# Patient Record
Sex: FEMALE | Race: WHITE | Employment: PART TIME | URBAN - METROPOLITAN AREA
[De-identification: names, ages, dates, MRNs, and addresses within clinical notes are randomized per-mention and may not be internally consistent; named-entity substitution may affect disease eponyms.]

---

## 2017-01-16 ENCOUNTER — OFFICE VISIT (OUTPATIENT)
Dept: FAMILY MEDICINE CLINIC | Age: 22
End: 2017-01-16

## 2017-01-16 VITALS
HEART RATE: 72 BPM | TEMPERATURE: 99.3 F | BODY MASS INDEX: 20.02 KG/M2 | WEIGHT: 143 LBS | HEIGHT: 71 IN | RESPIRATION RATE: 12 BRPM | DIASTOLIC BLOOD PRESSURE: 62 MMHG | SYSTOLIC BLOOD PRESSURE: 116 MMHG

## 2017-01-16 DIAGNOSIS — N94.6 DYSMENORRHEA: ICD-10-CM

## 2017-01-16 PROCEDURE — 99213 OFFICE O/P EST LOW 20 MIN: CPT | Performed by: FAMILY MEDICINE

## 2017-01-16 ASSESSMENT — PATIENT HEALTH QUESTIONNAIRE - PHQ9
SUM OF ALL RESPONSES TO PHQ QUESTIONS 1-9: 0
2. FEELING DOWN, DEPRESSED OR HOPELESS: 0
SUM OF ALL RESPONSES TO PHQ9 QUESTIONS 1 & 2: 0
1. LITTLE INTEREST OR PLEASURE IN DOING THINGS: 0

## 2017-09-13 ENCOUNTER — OFFICE VISIT (OUTPATIENT)
Dept: FAMILY MEDICINE CLINIC | Age: 22
End: 2017-09-13

## 2017-09-13 VITALS
HEIGHT: 71 IN | HEART RATE: 92 BPM | TEMPERATURE: 98.5 F | BODY MASS INDEX: 20.44 KG/M2 | DIASTOLIC BLOOD PRESSURE: 72 MMHG | SYSTOLIC BLOOD PRESSURE: 116 MMHG | OXYGEN SATURATION: 98 % | WEIGHT: 146 LBS | RESPIRATION RATE: 16 BRPM

## 2017-09-13 DIAGNOSIS — J02.9 SORE THROAT: ICD-10-CM

## 2017-09-13 DIAGNOSIS — J06.9 VIRAL URI: Primary | ICD-10-CM

## 2017-09-13 LAB — S PYO AG THROAT QL: NORMAL

## 2017-09-13 PROCEDURE — 99212 OFFICE O/P EST SF 10 MIN: CPT | Performed by: NURSE PRACTITIONER

## 2017-09-13 PROCEDURE — 87880 STREP A ASSAY W/OPTIC: CPT | Performed by: NURSE PRACTITIONER

## 2017-09-13 ASSESSMENT — PATIENT HEALTH QUESTIONNAIRE - PHQ9
SUM OF ALL RESPONSES TO PHQ9 QUESTIONS 1 & 2: 0
2. FEELING DOWN, DEPRESSED OR HOPELESS: 0
1. LITTLE INTEREST OR PLEASURE IN DOING THINGS: 0
SUM OF ALL RESPONSES TO PHQ QUESTIONS 1-9: 0

## 2017-09-13 ASSESSMENT — ENCOUNTER SYMPTOMS
SORE THROAT: 1
COUGH: 1

## 2017-10-20 ENCOUNTER — OFFICE VISIT (OUTPATIENT)
Dept: FAMILY MEDICINE CLINIC | Age: 22
End: 2017-10-20

## 2017-10-20 VITALS
RESPIRATION RATE: 16 BRPM | BODY MASS INDEX: 20.66 KG/M2 | WEIGHT: 147.6 LBS | OXYGEN SATURATION: 98 % | TEMPERATURE: 98.7 F | DIASTOLIC BLOOD PRESSURE: 70 MMHG | HEIGHT: 71 IN | SYSTOLIC BLOOD PRESSURE: 118 MMHG | HEART RATE: 75 BPM

## 2017-10-20 DIAGNOSIS — K04.7 TOOTH INFECTION: ICD-10-CM

## 2017-10-20 DIAGNOSIS — K04.7 TOOTH ABSCESS: Primary | ICD-10-CM

## 2017-10-20 PROCEDURE — G8484 FLU IMMUNIZE NO ADMIN: HCPCS | Performed by: NURSE PRACTITIONER

## 2017-10-20 PROCEDURE — G8420 CALC BMI NORM PARAMETERS: HCPCS | Performed by: NURSE PRACTITIONER

## 2017-10-20 PROCEDURE — 1036F TOBACCO NON-USER: CPT | Performed by: NURSE PRACTITIONER

## 2017-10-20 PROCEDURE — G8427 DOCREV CUR MEDS BY ELIG CLIN: HCPCS | Performed by: NURSE PRACTITIONER

## 2017-10-20 PROCEDURE — 99213 OFFICE O/P EST LOW 20 MIN: CPT | Performed by: NURSE PRACTITIONER

## 2017-10-20 RX ORDER — IBUPROFEN 800 MG/1
800 TABLET ORAL 2 TIMES DAILY WITH MEALS
Qty: 120 TABLET | Refills: 0 | Status: SHIPPED | OUTPATIENT
Start: 2017-10-20 | End: 2017-12-19

## 2017-10-20 RX ORDER — CLINDAMYCIN HYDROCHLORIDE 300 MG/1
300 CAPSULE ORAL 3 TIMES DAILY
Qty: 30 CAPSULE | Refills: 0 | Status: SHIPPED | OUTPATIENT
Start: 2017-10-20 | End: 2017-10-30

## 2017-10-20 ASSESSMENT — ENCOUNTER SYMPTOMS
NAUSEA: 1
WHEEZING: 0
FACIAL SWELLING: 1
SHORTNESS OF BREATH: 0
CHEST TIGHTNESS: 0
VOMITING: 0

## 2017-10-20 NOTE — PROGRESS NOTES
Subjective:      Patient ID: Kai Mortimer is a 22 y.o. female who presents today for:  Chief Complaint   Patient presents with    Jaw Pain     Pt c/o L jaw pain, swollen, wants to r/o infection x3 days       HPI     Jaw swelling for 3 days pain 7/10 never been to dentist didn't have insurance growing up. She is missing half a tooth on both sids of her lower jaw. She states that it hurts when she closes her mouth or chews it is sensitive to temperatures. She took Advil 200mg which does not help. Nothing else making worse or better. History reviewed. No pertinent past medical history. History reviewed. No pertinent surgical history. Family History   Problem Relation Age of Onset    Diabetes Paternal Uncle      Social History     Social History    Marital status: Single     Spouse name: N/A    Number of children: N/A    Years of education: N/A     Occupational History    Not on file. Social History Main Topics    Smoking status: Never Smoker    Smokeless tobacco: Never Used    Alcohol use No    Drug use: No    Sexual activity: No     Other Topics Concern    Not on file     Social History Narrative    No narrative on file     Current Outpatient Prescriptions on File Prior to Visit   Medication Sig Dispense Refill    norethindrone-ethinyl estradiol (NECON 7/7/7) 0.5/0.75/1-35 MG-MCG per tablet TAKE 1 TABLET BY MOUTH DAILY 28 tablet 11     No current facility-administered medications on file prior to visit. Allergies:  Amoxicillin    Review of Systems   Constitutional: Negative for activity change, appetite change, chills, diaphoresis, fatigue and fever. HENT: Positive for facial swelling (left lower jaw). Negative for drooling. Respiratory: Negative for chest tightness, shortness of breath and wheezing. Gastrointestinal: Positive for nausea (she thinks was from the advil use. ). Negative for vomiting. Skin:        Left frontal jaw swelling on bottom.     Allergic/Immunologic: Negative for environmental allergies and food allergies. Neurological: Negative for dizziness, weakness and light-headedness. Objective:   /70 (Site: Left Arm, Position: Sitting, Cuff Size: Medium Adult)   Pulse 75   Temp 98.7 °F (37.1 °C) (Temporal)   Resp 16   Ht 5' 10.5\" (1.791 m)   Wt 147 lb 9.6 oz (67 kg)   SpO2 98%   Breastfeeding? No   BMI 20.88 kg/m²     Physical Exam   Constitutional: She is oriented to person, place, and time. Vital signs are normal. She appears well-developed and well-nourished. HENT:   Head: Normocephalic. Mouth/Throat:       Eyes: Conjunctivae and EOM are normal.   Neck: Normal range of motion. Pulmonary/Chest: Effort normal.   Abdominal: Normal appearance. Musculoskeletal: Normal range of motion. Neurological: She is alert and oriented to person, place, and time. Skin: Skin is warm and dry. Psychiatric: She has a normal mood and affect. Her behavior is normal. Judgment and thought content normal.   Nursing note and vitals reviewed. Assessment:     1. Tooth abscess     2. Tooth infection         Plan:      No orders of the defined types were placed in this encounter. Orders Placed This Encounter   Medications    clindamycin (CLEOCIN) 300 MG capsule     Sig: Take 1 capsule by mouth 3 times daily for 10 days     Dispense:  30 capsule     Refill:  0    ibuprofen (ADVIL;MOTRIN) 800 MG tablet     Sig: Take 1 tablet by mouth 2 times daily (with meals)     Dispense:  120 tablet     Refill:  0     Antibiotics: To prevent antibiotic resistances please take medication as ordered and for the full duration even if you start to feel better. If you are using contraception please use a back up method of contraception such as condoms during antibiotic use and for 7 days after completing treatment to prevent pregnancy.     Females: Consider intake of yogurt or probiotic during antibiotic use and for a few days after to help reduce the risk of

## 2017-10-20 NOTE — PATIENT INSTRUCTIONS
Patient Education        Abscessed Tooth: Care Instructions  Your Care Instructions    An abscessed tooth is a tooth that has a pocket of pus in the tissues around it. Pus forms when the body tries to fight an infection caused by bacteria. If the pus cannot drain, it forms an abscess. An abscessed tooth can cause red, swollen gums and throbbing pain, especially when you chew. You may have a bad taste in your mouth and a fever, and your jaw may swell. Damage to the tooth, untreated tooth decay, or gum disease can cause an abscessed tooth. An abscessed tooth needs to be treated by a dental professional right away. If it is not treated, the infection could spread to other parts of your body. Your dentist will give you antibiotics to stop the infection. He or she may make a hole in the tooth or cut open (josé miguel) the abscess inside your mouth so that the infection can drain, which should relieve your pain. You may need to have a root canal treatment, which tries to save your tooth by taking out the infected pulp and replacing it with a healing medicine and/or a filling. If these treatments do not work, your tooth may have to be removed. Follow-up care is a key part of your treatment and safety. Be sure to make and go to all appointments, and call your doctor if you are having problems. It's also a good idea to know your test results and keep a list of the medicines you take. How can you care for yourself at home? · Reduce pain and swelling in your face and jaw by putting ice or a cold pack on the outside of your cheek for 10 to 20 minutes at a time. Put a thin cloth between the ice and your skin. · Take pain medicines exactly as directed. ¨ If the doctor gave you a prescription medicine for pain, take it as prescribed. ¨ If you are not taking a prescription pain medicine, ask your doctor if you can take an over-the-counter medicine. · Take your antibiotics as directed.  Do not stop taking them just because you feel better. You need to take the full course of antibiotics. To prevent tooth abscess  · Brush and floss every day, and have regular dental checkups. · Eat a healthy diet, and avoid sugary foods and drinks. · Do not smoke, use e-cigarettes with nicotine, or use spit tobacco. Tobacco and nicotine slow your ability to heal. Tobacco also increases your risk for gum disease and cancer of the mouth and throat. If you need help quitting, talk to your doctor about stop-smoking programs and medicines. These can increase your chances of quitting for good. When should you call for help? Call 911 anytime you think you may need emergency care. For example, call if:  · You have trouble breathing. Call your doctor now or seek immediate medical care if:  · You have new or worse symptoms of infection, such as:  ¨ Increased pain, swelling, warmth, or redness. ¨ Red streaks leading from the area. ¨ Pus draining from the area. ¨ A fever. Watch closely for changes in your health, and be sure to contact your doctor if:  · You do not get better as expected. Where can you learn more? Go to https://YPlan.[employer]Meeting. org and sign in to your [employer] account. Enter D145 in the [facility] box to learn more about \"Abscessed Tooth: Care Instructions. \"     If you do not have an account, please click on the \"Sign Up Now\" link. Current as of: September 19, 2016  Content Version: 11.3  © 3900-3321 [employer], [employer]. Care instructions adapted under license by [facility] ([facility]). If you have questions about a medical condition or this instruction, always ask your healthcare professional. [place] [address] any warranty or liability for your use of this information.

## 2017-11-27 ENCOUNTER — OFFICE VISIT (OUTPATIENT)
Dept: FAMILY MEDICINE CLINIC | Age: 22
End: 2017-11-27

## 2017-11-27 VITALS
HEART RATE: 72 BPM | SYSTOLIC BLOOD PRESSURE: 102 MMHG | TEMPERATURE: 98.9 F | BODY MASS INDEX: 20.23 KG/M2 | RESPIRATION RATE: 12 BRPM | DIASTOLIC BLOOD PRESSURE: 58 MMHG | WEIGHT: 143 LBS

## 2017-11-27 DIAGNOSIS — R10.13 ABDOMINAL PAIN, EPIGASTRIC: Primary | ICD-10-CM

## 2017-11-27 DIAGNOSIS — Z23 NEED FOR HEPATITIS B VACCINATION: ICD-10-CM

## 2017-11-27 DIAGNOSIS — Z00.00 ROUTINE GENERAL MEDICAL EXAMINATION AT A HEALTH CARE FACILITY: ICD-10-CM

## 2017-11-27 PROCEDURE — G8427 DOCREV CUR MEDS BY ELIG CLIN: HCPCS | Performed by: FAMILY MEDICINE

## 2017-11-27 PROCEDURE — 90471 IMMUNIZATION ADMIN: CPT | Performed by: FAMILY MEDICINE

## 2017-11-27 PROCEDURE — G8420 CALC BMI NORM PARAMETERS: HCPCS | Performed by: FAMILY MEDICINE

## 2017-11-27 PROCEDURE — G8484 FLU IMMUNIZE NO ADMIN: HCPCS | Performed by: FAMILY MEDICINE

## 2017-11-27 PROCEDURE — 86580 TB INTRADERMAL TEST: CPT | Performed by: FAMILY MEDICINE

## 2017-11-27 PROCEDURE — 1036F TOBACCO NON-USER: CPT | Performed by: FAMILY MEDICINE

## 2017-11-27 PROCEDURE — 90746 HEPB VACCINE 3 DOSE ADULT IM: CPT | Performed by: FAMILY MEDICINE

## 2017-11-27 PROCEDURE — 99213 OFFICE O/P EST LOW 20 MIN: CPT | Performed by: FAMILY MEDICINE

## 2017-11-27 NOTE — PROGRESS NOTES
Chief Complaint   Patient presents with    Annual Exam     school exam        HPI:   She reports having some stomach problems. This started last Monday and got better by Friday. She had nausea, abdominal pain, loss of appetite, and diarrhea. This is better now. Her appetitive is improved and the nausea resolved. Humberto Garcia is a 22 y.o. female presenting for school physical. She needs immunizations for school. EXAM:  Constitutional Blood pressure (!) 102/58, pulse 72, temperature 98.9 °F (37.2 °C), temperature source Temporal, resp. rate 12, weight 143 lb (64.9 kg), not currently breastfeeding. Darlene Weston Physical Exam   Constitutional: She is oriented to person, place, and time. She appears well-developed and well-nourished. Cardiovascular: Normal rate, regular rhythm and normal heart sounds. Pulmonary/Chest: Effort normal and breath sounds normal.   Abdominal: Soft. Bowel sounds are normal. She exhibits no distension. There is no tenderness. Neurological: She is alert and oriented to person, place, and time. DIAGNOSIS:   1. Abdominal pain, epigastric      Intermittently symptomatic, no further workup at present. 2. Need for hepatitis B vaccination  Hep B Vaccine Adult (RECOMBIVAX HB)    Give first immunization. 3. Routine general medical examination at a health care facility  Mantoux testing    Check Mantoux test as this is required by school. patient will need hepatitis B vaccinations in 1 and 6 months. Plan for follow up: Follow up in 1 year with blood work as ordered. Other follow up as needed.       Electronically signed by Jimbo Wasserman, 10:18 PM 11/27/17

## 2017-11-27 NOTE — LETTER
[facility]  [address] [facility] [address]  Phone: [phone]  Fax: [phone]    Franca Stearns MD        November 27, 2017     Patient: Hillary Kathleen   YOB: 1995   Date of Visit: 11/27/2017       To Whom It May Concern: It is my medical opinion that Amber Francis may return to full duty immediately with no restrictions. She was sick from 11/23/17 through today. If you have any questions or concerns, please don't hesitate to call.     Sincerely,        Franca Stearns MD

## 2017-11-29 ENCOUNTER — NURSE ONLY (OUTPATIENT)
Dept: FAMILY MEDICINE CLINIC | Age: 22
End: 2017-11-29

## 2017-11-29 NOTE — LETTER
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 The results of this test are as follows: No results found for: PPD, INDURATION  This test was negative for tuberculosis exposure per current Centers for Disease Control guidelines. A chest x-ray was not required. If you have any questions or concerns, please don't hesitate to call.     Sincerely,        Milan Cheatham

## 2017-11-29 NOTE — PROGRESS NOTES
Patient came in to have her TB test read. It was on her right arm. It was negative. Patient will schedule her next TB shot at check out.

## 2017-12-04 ENCOUNTER — NURSE ONLY (OUTPATIENT)
Dept: FAMILY MEDICINE CLINIC | Age: 22
End: 2017-12-04

## 2017-12-04 DIAGNOSIS — Z11.1 SCREENING EXAMINATION FOR PULMONARY TUBERCULOSIS: Primary | ICD-10-CM

## 2017-12-04 PROCEDURE — 86580 TB INTRADERMAL TEST: CPT | Performed by: FAMILY MEDICINE

## 2017-12-07 ENCOUNTER — NURSE ONLY (OUTPATIENT)
Dept: FAMILY MEDICINE CLINIC | Age: 22
End: 2017-12-07

## 2017-12-07 NOTE — LETTER
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 The results of this test are as follows: This test was negative for tuberculosis exposure per current Centers for Disease Control guidelines. A chest x-ray was not required. If you have any questions or concerns, please don't hesitate to call.     Sincerely,        Myrick Moritz

## 2018-02-05 ENCOUNTER — NURSE ONLY (OUTPATIENT)
Dept: FAMILY MEDICINE CLINIC | Age: 23
End: 2018-02-05
Payer: COMMERCIAL

## 2018-02-05 DIAGNOSIS — Z23 NEED FOR HEPATITIS B VACCINATION: Primary | ICD-10-CM

## 2018-02-05 PROCEDURE — 90471 IMMUNIZATION ADMIN: CPT | Performed by: FAMILY MEDICINE

## 2018-02-05 PROCEDURE — 90746 HEPB VACCINE 3 DOSE ADULT IM: CPT | Performed by: FAMILY MEDICINE

## 2018-03-29 ENCOUNTER — OFFICE VISIT (OUTPATIENT)
Dept: FAMILY MEDICINE CLINIC | Age: 23
End: 2018-03-29
Payer: COMMERCIAL

## 2018-03-29 VITALS
RESPIRATION RATE: 16 BRPM | SYSTOLIC BLOOD PRESSURE: 122 MMHG | WEIGHT: 149 LBS | TEMPERATURE: 99.2 F | BODY MASS INDEX: 21.33 KG/M2 | HEART RATE: 104 BPM | OXYGEN SATURATION: 99 % | HEIGHT: 70 IN | DIASTOLIC BLOOD PRESSURE: 80 MMHG

## 2018-03-29 DIAGNOSIS — F32.1 MODERATE SINGLE CURRENT EPISODE OF MAJOR DEPRESSIVE DISORDER (HCC): Primary | ICD-10-CM

## 2018-03-29 PROCEDURE — 96160 PT-FOCUSED HLTH RISK ASSMT: CPT | Performed by: FAMILY MEDICINE

## 2018-03-29 PROCEDURE — G8427 DOCREV CUR MEDS BY ELIG CLIN: HCPCS | Performed by: FAMILY MEDICINE

## 2018-03-29 PROCEDURE — G8420 CALC BMI NORM PARAMETERS: HCPCS | Performed by: FAMILY MEDICINE

## 2018-03-29 PROCEDURE — 1036F TOBACCO NON-USER: CPT | Performed by: FAMILY MEDICINE

## 2018-03-29 PROCEDURE — 99213 OFFICE O/P EST LOW 20 MIN: CPT | Performed by: FAMILY MEDICINE

## 2018-03-29 PROCEDURE — G8484 FLU IMMUNIZE NO ADMIN: HCPCS | Performed by: FAMILY MEDICINE

## 2018-03-29 RX ORDER — ESCITALOPRAM OXALATE 10 MG/1
10 TABLET ORAL DAILY
Qty: 30 TABLET | Refills: 0 | Status: SHIPPED | OUTPATIENT
Start: 2018-03-29 | End: 2018-04-28 | Stop reason: SDUPTHER

## 2018-03-29 ASSESSMENT — PATIENT HEALTH QUESTIONNAIRE - PHQ9
6. FEELING BAD ABOUT YOURSELF - OR THAT YOU ARE A FAILURE OR HAVE LET YOURSELF OR YOUR FAMILY DOWN: 0
1. LITTLE INTEREST OR PLEASURE IN DOING THINGS: 3
4. FEELING TIRED OR HAVING LITTLE ENERGY: 3
SUM OF ALL RESPONSES TO PHQ QUESTIONS 1-9: 14
7. TROUBLE CONCENTRATING ON THINGS, SUCH AS READING THE NEWSPAPER OR WATCHING TELEVISION: 0
8. MOVING OR SPEAKING SO SLOWLY THAT OTHER PEOPLE COULD HAVE NOTICED. OR THE OPPOSITE, BEING SO FIGETY OR RESTLESS THAT YOU HAVE BEEN MOVING AROUND A LOT MORE THAN USUAL: 0
3. TROUBLE FALLING OR STAYING ASLEEP: 3
2. FEELING DOWN, DEPRESSED OR HOPELESS: 3
5. POOR APPETITE OR OVEREATING: 2
SUM OF ALL RESPONSES TO PHQ9 QUESTIONS 1 & 2: 6
10. IF YOU CHECKED OFF ANY PROBLEMS, HOW DIFFICULT HAVE THESE PROBLEMS MADE IT FOR YOU TO DO YOUR WORK, TAKE CARE OF THINGS AT HOME, OR GET ALONG WITH OTHER PEOPLE: 0
9. THOUGHTS THAT YOU WOULD BE BETTER OFF DEAD, OR OF HURTING YOURSELF: 0

## 2018-03-31 PROBLEM — F32.1 MODERATE SINGLE CURRENT EPISODE OF MAJOR DEPRESSIVE DISORDER (HCC): Status: ACTIVE | Noted: 2018-03-31

## 2018-04-28 ENCOUNTER — OFFICE VISIT (OUTPATIENT)
Dept: FAMILY MEDICINE CLINIC | Age: 23
End: 2018-04-28
Payer: COMMERCIAL

## 2018-04-28 VITALS
HEART RATE: 71 BPM | OXYGEN SATURATION: 99 % | BODY MASS INDEX: 22.68 KG/M2 | DIASTOLIC BLOOD PRESSURE: 84 MMHG | HEIGHT: 70 IN | TEMPERATURE: 98.1 F | WEIGHT: 158.4 LBS | RESPIRATION RATE: 16 BRPM | SYSTOLIC BLOOD PRESSURE: 110 MMHG

## 2018-04-28 DIAGNOSIS — F32.1 MODERATE SINGLE CURRENT EPISODE OF MAJOR DEPRESSIVE DISORDER (HCC): ICD-10-CM

## 2018-04-28 PROCEDURE — G8427 DOCREV CUR MEDS BY ELIG CLIN: HCPCS | Performed by: FAMILY MEDICINE

## 2018-04-28 PROCEDURE — G8420 CALC BMI NORM PARAMETERS: HCPCS | Performed by: FAMILY MEDICINE

## 2018-04-28 PROCEDURE — 99213 OFFICE O/P EST LOW 20 MIN: CPT | Performed by: FAMILY MEDICINE

## 2018-04-28 PROCEDURE — 1036F TOBACCO NON-USER: CPT | Performed by: FAMILY MEDICINE

## 2018-04-28 RX ORDER — ESCITALOPRAM OXALATE 10 MG/1
10 TABLET ORAL DAILY
Qty: 90 TABLET | Refills: 1 | Status: SHIPPED | OUTPATIENT
Start: 2018-04-28 | End: 2018-09-14 | Stop reason: SDUPTHER

## 2018-04-28 ASSESSMENT — PATIENT HEALTH QUESTIONNAIRE - PHQ9
1. LITTLE INTEREST OR PLEASURE IN DOING THINGS: 0
SUM OF ALL RESPONSES TO PHQ QUESTIONS 1-9: 0
SUM OF ALL RESPONSES TO PHQ9 QUESTIONS 1 & 2: 0
2. FEELING DOWN, DEPRESSED OR HOPELESS: 0

## 2018-05-29 DIAGNOSIS — F32.1 MODERATE SINGLE CURRENT EPISODE OF MAJOR DEPRESSIVE DISORDER (HCC): ICD-10-CM

## 2018-05-29 RX ORDER — ESCITALOPRAM OXALATE 10 MG/1
10 TABLET ORAL DAILY
Qty: 90 TABLET | Refills: 1 | OUTPATIENT
Start: 2018-05-29

## 2018-08-06 ENCOUNTER — NURSE ONLY (OUTPATIENT)
Dept: FAMILY MEDICINE CLINIC | Age: 23
End: 2018-08-06
Payer: COMMERCIAL

## 2018-08-06 DIAGNOSIS — Z23 NEED FOR HEPATITIS B VACCINATION: Primary | ICD-10-CM

## 2018-08-06 PROCEDURE — 90746 HEPB VACCINE 3 DOSE ADULT IM: CPT | Performed by: FAMILY MEDICINE

## 2018-08-06 PROCEDURE — 90471 IMMUNIZATION ADMIN: CPT | Performed by: FAMILY MEDICINE

## 2018-08-06 NOTE — PROGRESS NOTES
After obtaining consent, and per orders of Dr. Shirley De Luna, injection of Hep B given in Right deltoid by Ricardo Morgan. Patient instructed to remain in clinic for 20 minutes afterwards, and to report any adverse reaction to me immediately.

## 2018-09-14 DIAGNOSIS — F32.1 MODERATE SINGLE CURRENT EPISODE OF MAJOR DEPRESSIVE DISORDER (HCC): ICD-10-CM

## 2018-09-14 NOTE — TELEPHONE ENCOUNTER
Pharmacy requests refill on medication.  Please approve or deny this request.    LOV 4/28/2018    Future Appointments  Date Time Provider Margarita Tong   10/24/2018 12:30 PM Patsi Curling, MD [address] [place]

## 2018-09-17 RX ORDER — ESCITALOPRAM OXALATE 10 MG/1
TABLET ORAL
Qty: 150 TABLET | Refills: 2 | Status: SHIPPED | OUTPATIENT
Start: 2018-09-17 | End: 2018-10-24 | Stop reason: SDUPTHER

## 2018-10-24 ENCOUNTER — OFFICE VISIT (OUTPATIENT)
Dept: FAMILY MEDICINE CLINIC | Age: 23
End: 2018-10-24
Payer: COMMERCIAL

## 2018-10-24 VITALS
RESPIRATION RATE: 18 BRPM | OXYGEN SATURATION: 95 % | TEMPERATURE: 98.3 F | HEIGHT: 70 IN | HEART RATE: 69 BPM | WEIGHT: 164.2 LBS | BODY MASS INDEX: 23.51 KG/M2 | DIASTOLIC BLOOD PRESSURE: 78 MMHG | SYSTOLIC BLOOD PRESSURE: 100 MMHG

## 2018-10-24 DIAGNOSIS — F32.1 MODERATE SINGLE CURRENT EPISODE OF MAJOR DEPRESSIVE DISORDER (HCC): Primary | ICD-10-CM

## 2018-10-24 DIAGNOSIS — Z13.31 POSITIVE DEPRESSION SCREENING: ICD-10-CM

## 2018-10-24 PROCEDURE — 1036F TOBACCO NON-USER: CPT | Performed by: FAMILY MEDICINE

## 2018-10-24 PROCEDURE — G8427 DOCREV CUR MEDS BY ELIG CLIN: HCPCS | Performed by: FAMILY MEDICINE

## 2018-10-24 PROCEDURE — G8420 CALC BMI NORM PARAMETERS: HCPCS | Performed by: FAMILY MEDICINE

## 2018-10-24 PROCEDURE — G8484 FLU IMMUNIZE NO ADMIN: HCPCS | Performed by: FAMILY MEDICINE

## 2018-10-24 PROCEDURE — 96160 PT-FOCUSED HLTH RISK ASSMT: CPT | Performed by: FAMILY MEDICINE

## 2018-10-24 PROCEDURE — 99213 OFFICE O/P EST LOW 20 MIN: CPT | Performed by: FAMILY MEDICINE

## 2018-10-24 PROCEDURE — G8431 POS CLIN DEPRES SCRN F/U DOC: HCPCS | Performed by: FAMILY MEDICINE

## 2018-10-24 RX ORDER — ESCITALOPRAM OXALATE 20 MG/1
20 TABLET ORAL DAILY
Qty: 30 TABLET | Refills: 5 | Status: SHIPPED | OUTPATIENT
Start: 2018-10-24 | End: 2022-05-03

## 2018-10-24 ASSESSMENT — PATIENT HEALTH QUESTIONNAIRE - PHQ9
9. THOUGHTS THAT YOU WOULD BE BETTER OFF DEAD, OR OF HURTING YOURSELF: 0
4. FEELING TIRED OR HAVING LITTLE ENERGY: 3
6. FEELING BAD ABOUT YOURSELF - OR THAT YOU ARE A FAILURE OR HAVE LET YOURSELF OR YOUR FAMILY DOWN: 0
8. MOVING OR SPEAKING SO SLOWLY THAT OTHER PEOPLE COULD HAVE NOTICED. OR THE OPPOSITE, BEING SO FIGETY OR RESTLESS THAT YOU HAVE BEEN MOVING AROUND A LOT MORE THAN USUAL: 0
3. TROUBLE FALLING OR STAYING ASLEEP: 3
1. LITTLE INTEREST OR PLEASURE IN DOING THINGS: 2
SUM OF ALL RESPONSES TO PHQ QUESTIONS 1-9: 12
2. FEELING DOWN, DEPRESSED OR HOPELESS: 2
SUM OF ALL RESPONSES TO PHQ9 QUESTIONS 1 & 2: 4
10. IF YOU CHECKED OFF ANY PROBLEMS, HOW DIFFICULT HAVE THESE PROBLEMS MADE IT FOR YOU TO DO YOUR WORK, TAKE CARE OF THINGS AT HOME, OR GET ALONG WITH OTHER PEOPLE: 1
5. POOR APPETITE OR OVEREATING: 0
7. TROUBLE CONCENTRATING ON THINGS, SUCH AS READING THE NEWSPAPER OR WATCHING TELEVISION: 2
SUM OF ALL RESPONSES TO PHQ QUESTIONS 1-9: 12

## 2018-10-24 NOTE — PROGRESS NOTES
up from 10- patient will call for any significant medication side effects or worsening symptoms of depression. Patient will follow-up in 6 month(s) with PCP.

## 2019-02-21 ENCOUNTER — TELEPHONE (OUTPATIENT)
Dept: FAMILY MEDICINE CLINIC | Age: 24
End: 2019-02-21

## 2019-02-25 ENCOUNTER — TELEPHONE (OUTPATIENT)
Dept: FAMILY MEDICINE CLINIC | Age: 24
End: 2019-02-25

## 2019-04-26 ENCOUNTER — OFFICE VISIT (OUTPATIENT)
Dept: OBGYN CLINIC | Age: 24
End: 2019-04-26
Payer: COMMERCIAL

## 2019-04-26 VITALS
HEART RATE: 84 BPM | HEIGHT: 70 IN | WEIGHT: 161 LBS | DIASTOLIC BLOOD PRESSURE: 62 MMHG | SYSTOLIC BLOOD PRESSURE: 100 MMHG | BODY MASS INDEX: 23.05 KG/M2

## 2019-04-26 DIAGNOSIS — N94.10 DYSPAREUNIA, FEMALE: Primary | ICD-10-CM

## 2019-04-26 DIAGNOSIS — N94.6 DYSMENORRHEA: ICD-10-CM

## 2019-04-26 DIAGNOSIS — N94.10 DYSPAREUNIA, FEMALE: ICD-10-CM

## 2019-04-26 PROCEDURE — 99213 OFFICE O/P EST LOW 20 MIN: CPT | Performed by: OBSTETRICS & GYNECOLOGY

## 2019-04-26 PROCEDURE — 1036F TOBACCO NON-USER: CPT | Performed by: OBSTETRICS & GYNECOLOGY

## 2019-04-26 PROCEDURE — G8427 DOCREV CUR MEDS BY ELIG CLIN: HCPCS | Performed by: OBSTETRICS & GYNECOLOGY

## 2019-04-26 PROCEDURE — G8420 CALC BMI NORM PARAMETERS: HCPCS | Performed by: OBSTETRICS & GYNECOLOGY

## 2019-04-26 NOTE — PROGRESS NOTES
Gulshan Stout is a 26 y.o. female who presents here today for complaints of pain during intercourse, has been happening recently with no partner which patient does report might have \"larger organ\" according to her description. Patient mentions that she gets the pain occasionally, patient also mentions that the pain is mild is crampy in nature. Patient denies any fever or chills, denies any nausea or vomiting. Patient denies any abnormal vaginal discharge. Vitals:  /62 (Site: Left Upper Arm, Position: Sitting, Cuff Size: Medium Adult)   Pulse 84   Ht 5' 10\" (1.778 m)   Wt 161 lb (73 kg)   LMP 04/17/2019 (Approximate)   BMI 23.10 kg/m²   Allergies:  Amoxicillin  No past medical history on file. No past surgical history on file.   OB History    None       Family History   Problem Relation Age of Onset    Cancer Mother     Diabetes Paternal Uncle      Social History     Socioeconomic History    Marital status: Single     Spouse name: Not on file    Number of children: Not on file    Years of education: Not on file    Highest education level: Not on file   Occupational History    Not on file   Social Needs    Financial resource strain: Not on file    Food insecurity:     Worry: Not on file     Inability: Not on file    Transportation needs:     Medical: Not on file     Non-medical: Not on file   Tobacco Use    Smoking status: Never Smoker    Smokeless tobacco: Never Used   Substance and Sexual Activity    Alcohol use: No    Drug use: No    Sexual activity: Never   Lifestyle    Physical activity:     Days per week: Not on file     Minutes per session: Not on file    Stress: Not on file   Relationships    Social connections:     Talks on phone: Not on file     Gets together: Not on file     Attends Voodoo service: Not on file     Active member of club or organization: Not on file     Attends meetings of clubs or organizations: Not on file     Relationship status: Not on file  Intimate partner violence:     Fear of current or ex partner: Not on file     Emotionally abused: Not on file     Physically abused: Not on file     Forced sexual activity: Not on file   Other Topics Concern    Not on file   Social History Narrative    Not on file       Contraceptive method:  OCP (estrogen/progesterone)    Patient's medications, allergies, past medical, surgical, social and family histories were reviewed and updated as appropriate. Review of Systems  As per chief complaint   All other systems reviewed and are negative. Pain with intercourse  Physical Exam:  Vitals:  /62 (Site: Left Upper Arm, Position: Sitting, Cuff Size: Medium Adult)   Pulse 84   Ht 5' 10\" (1.778 m)   Wt 161 lb (73 kg)   LMP 04/17/2019 (Approximate)   BMI 23.10 kg/m²   Lungs: CTAB   Heart : Regular S1/S2, no M/R/G  Abdomen: Soft , NT, ND , + BS   Pelvic exam : Deferred    Assessment:      Diagnosis Orders   1.  Dyspareunia, female  US Non OB Transvaginal    US Pelvis Complete    Trichomonas Vaginalis Rna, Qual Tma, Pap Via    C.trachomatis N.gonorrhoeae DNA, Urine       Plan:     Pelvic ultrasound sent  Gonorrhea Chlamydia trichomonas also ordered and urine specimen  Patient advised to follow up for annual exam visit including Pap smear  Discussed at pain with intercourse due to the reason she had menses mentioned in her H&P is normal I did explain to the patient to avoid the positions that could be eliciting more pain than the usual.    Orders Placed This Encounter   Procedures    Trichomonas Vaginalis Rna, Qual Tma, Pap Via     Standing Status:   Future     Standing Expiration Date:   4/26/2020    C.trachomatis N.gonorrhoeae DNA, Urine     Standing Status:   Future     Standing Expiration Date:   4/26/2020    US Non OB Transvaginal     Standing Status:   Future     Standing Expiration Date:   4/25/2020    US Pelvis Complete     Standing Status:   Future     Standing Expiration Date:   4/25/2020     Order Specific Question:   Reason for exam:     Answer:   AUB     No orders of the defined types were placed in this encounter. Patient was seen with total face to face time of 15 minutes. More than 50% of this visit was counseling and education regarding The encounter diagnosis was Dyspareunia, female. and Discuss Medications (Contraception. Previously on OCP but would like to discuss other options. ) and Vaginal Bleeding (Bled after intercourse a couple of weeks ago. )   as well as  counseling on preventative health maintenance follow-up. Follow Up:  Return in about 2 weeks (around 5/10/2019) for F/U for results.         Anthony Thomas MD

## 2019-04-26 NOTE — TELEPHONE ENCOUNTER
Patient contacted and made aware of prescription being sent to the pharmacy. Annual scheduled for 5/10/19.

## 2019-04-30 LAB
SPECIMEN SOURCE: NORMAL
T. VAGINALIS AMPLIFIED: NEGATIVE

## 2019-05-02 LAB
C. TRACHOMATIS DNA ,URINE: NEGATIVE
N. GONORRHOEAE DNA, URINE: NEGATIVE

## 2019-05-10 ENCOUNTER — OFFICE VISIT (OUTPATIENT)
Dept: OBGYN CLINIC | Age: 24
End: 2019-05-10
Payer: COMMERCIAL

## 2019-05-10 VITALS
HEIGHT: 70 IN | WEIGHT: 162 LBS | DIASTOLIC BLOOD PRESSURE: 62 MMHG | SYSTOLIC BLOOD PRESSURE: 100 MMHG | HEART RATE: 96 BPM | BODY MASS INDEX: 23.19 KG/M2

## 2019-05-10 DIAGNOSIS — Z11.51 SCREENING FOR HPV (HUMAN PAPILLOMAVIRUS): ICD-10-CM

## 2019-05-10 DIAGNOSIS — Z01.419 VISIT FOR GYNECOLOGIC EXAMINATION: Primary | ICD-10-CM

## 2019-05-10 PROCEDURE — 99395 PREV VISIT EST AGE 18-39: CPT | Performed by: OBSTETRICS & GYNECOLOGY

## 2019-05-10 NOTE — PROGRESS NOTES
Subjective:      Kaleb Moreland is a 26 y. o.female No obstetric history on file here for routine exam. Current Complaints: normal menses, no abnormal bleeding, pelvic pain or discharge, no breast pain or new or enlarging lumps on self exam.    Menstrual history:  regular menses   Sexual activity:  yes, denies knowledge of risky exposure  Abnormal vaginaldischarge:  No  Contraceptive method:  OCP (estrogen/progesterone)    Vitals:  /62 (Site: Left Upper Arm, Position: Sitting, Cuff Size: Medium Adult)   Pulse 96   Ht 5' 10\" (1.778 m)   Wt 162 lb (73.5 kg)   LMP 04/17/2019 (Approximate)   BMI 23.24 kg/m²   Allergies:  Amoxicillin   No past medical history on file. No past surgical history on file.   Family History   Problem Relation Age of Onset    Cancer Mother     Diabetes Paternal Uncle      Social History     Socioeconomic History    Marital status: Single     Spouse name: Not on file    Number of children: Not on file    Years of education: Not on file    Highest education level: Not on file   Occupational History    Not on file   Social Needs    Financial resource strain: Not on file    Food insecurity:     Worry: Not on file     Inability: Not on file    Transportation needs:     Medical: Not on file     Non-medical: Not on file   Tobacco Use    Smoking status: Never Smoker    Smokeless tobacco: Never Used   Substance and Sexual Activity    Alcohol use: No    Drug use: No    Sexual activity: Never   Lifestyle    Physical activity:     Days per week: Not on file     Minutes per session: Not on file    Stress: Not on file   Relationships    Social connections:     Talks on phone: Not on file     Gets together: Not on file     Attends Worship service: Not on file     Active member of club or organization: Not on file     Attends meetings of clubs or organizations: Not on file     Relationship status: Not on file    Intimate partner violence:     Fear of current or ex partner: Not on file     Emotionally abused: Not on file     Physically abused: Not on file     Forced sexual activity: Not on file   Other Topics Concern    Not on file   Social History Narrative    Not on file       Gynecologic History  Patient's last menstrual period was 04/17/2019 (approximate). Last Pap: none   Results: negative   Last Mammogram: No Results: N/A  FH breast cancer : mother's aunt. Mother : endometrial cancer. OB History    None       Patient's medications, allergies, past medical, surgical, social and family histories were reviewed and updated as appropriate. Review of Systems  Review of Systems  Review of Systems   Constitutional: Negative for chills and fever. Respiratory: Negative for cough and shortnessof breath. Cardiovascular: Negative for chest pain and palpitations. Gastrointestinal: Negative for nausea and vomiting. Genitourinary: Negative for dysuria,frequency and urgency. Musculoskeletal: Negative for myalgias. Neurological: Negative for dizziness, seizures and headaches. Psychiatric/Behavioral: Negative for depression and suicidal ideas. All other systemsreviewed and are negative. Objective:     Vitals:  /62 (Site: Left Upper Arm, Position: Sitting, Cuff Size: Medium Adult)   Pulse 96   Ht 5' 10\" (1.778 m)   Wt 162 lb (73.5 kg)   LMP 04/17/2019 (Approximate)   BMI 23.24 kg/m²     Physical Exam  Physical Exam  Physical Exam   Constitutional: She is oriented to person,place, and time and well-developed, well-nourished, and in no distress. HENT:   Head: Normocephalic and atraumatic. Eyes: Conjunctivae are normal. Pupils are equal, round, andreactive to light. Neck: Normal range of motion. Neck supple. Cardiovascular: Normal rate and regular rhythm. Pulmonary/Chest: Effort normal and breath soundsnormal. No respiratory distress. Right breast exhibits no inverted nipple, no mass, no nipple discharge, no skin change and no tenderness.  Left

## 2019-05-20 LAB — PAP SMEAR: NORMAL

## 2019-06-09 PROBLEM — Z11.51 SCREENING FOR HPV (HUMAN PAPILLOMAVIRUS): Status: RESOLVED | Noted: 2019-05-10 | Resolved: 2019-06-09

## 2019-06-09 PROBLEM — Z01.419 VISIT FOR GYNECOLOGIC EXAMINATION: Status: RESOLVED | Noted: 2019-05-10 | Resolved: 2019-06-09

## 2019-08-19 ENCOUNTER — OFFICE VISIT (OUTPATIENT)
Dept: OBGYN CLINIC | Age: 24
End: 2019-08-19
Payer: COMMERCIAL

## 2019-08-19 VITALS
HEIGHT: 70 IN | SYSTOLIC BLOOD PRESSURE: 100 MMHG | HEART RATE: 88 BPM | DIASTOLIC BLOOD PRESSURE: 62 MMHG | WEIGHT: 159 LBS | BODY MASS INDEX: 22.76 KG/M2

## 2019-08-19 DIAGNOSIS — N92.6 IRREGULAR MENSES: Primary | ICD-10-CM

## 2019-08-19 PROCEDURE — G8427 DOCREV CUR MEDS BY ELIG CLIN: HCPCS | Performed by: OBSTETRICS & GYNECOLOGY

## 2019-08-19 PROCEDURE — G8420 CALC BMI NORM PARAMETERS: HCPCS | Performed by: OBSTETRICS & GYNECOLOGY

## 2019-08-19 PROCEDURE — 1036F TOBACCO NON-USER: CPT | Performed by: OBSTETRICS & GYNECOLOGY

## 2019-08-19 PROCEDURE — 99213 OFFICE O/P EST LOW 20 MIN: CPT | Performed by: OBSTETRICS & GYNECOLOGY

## 2019-09-20 ENCOUNTER — TELEPHONE (OUTPATIENT)
Dept: OBGYN CLINIC | Age: 24
End: 2019-09-20

## 2021-01-22 DIAGNOSIS — N94.6 DYSMENORRHEA: ICD-10-CM

## 2021-01-22 RX ORDER — NORETHINDRONE AND ETHINYL ESTRADIOL 7 DAYS X 3
KIT ORAL
Qty: 28 TABLET | Refills: 11 | Status: SHIPPED | OUTPATIENT
Start: 2021-01-22 | End: 2021-12-24

## 2021-12-21 DIAGNOSIS — N94.6 DYSMENORRHEA: ICD-10-CM

## 2021-12-24 RX ORDER — NORETHINDRONE AND ETHINYL ESTRADIOL 7 DAYS X 3
KIT ORAL
Qty: 28 TABLET | Refills: 11 | Status: SHIPPED | OUTPATIENT
Start: 2021-12-24

## 2022-05-03 ENCOUNTER — OFFICE VISIT (OUTPATIENT)
Dept: OBGYN CLINIC | Age: 27
End: 2022-05-03
Payer: COMMERCIAL

## 2022-05-03 VITALS
HEART RATE: 72 BPM | DIASTOLIC BLOOD PRESSURE: 62 MMHG | SYSTOLIC BLOOD PRESSURE: 104 MMHG | BODY MASS INDEX: 27.06 KG/M2 | HEIGHT: 70 IN | WEIGHT: 189 LBS

## 2022-05-03 DIAGNOSIS — N92.6 IRREGULAR BLEEDING: Primary | ICD-10-CM

## 2022-05-03 DIAGNOSIS — N92.1 BREAKTHROUGH BLEEDING ON OCPS: ICD-10-CM

## 2022-05-03 PROCEDURE — 1036F TOBACCO NON-USER: CPT | Performed by: OBSTETRICS & GYNECOLOGY

## 2022-05-03 PROCEDURE — G8427 DOCREV CUR MEDS BY ELIG CLIN: HCPCS | Performed by: OBSTETRICS & GYNECOLOGY

## 2022-05-03 PROCEDURE — 99213 OFFICE O/P EST LOW 20 MIN: CPT | Performed by: OBSTETRICS & GYNECOLOGY

## 2022-05-03 PROCEDURE — G8419 CALC BMI OUT NRM PARAM NOF/U: HCPCS | Performed by: OBSTETRICS & GYNECOLOGY

## 2022-05-03 RX ORDER — TRAZODONE HYDROCHLORIDE 100 MG/1
TABLET ORAL
COMMUNITY
Start: 2022-05-02

## 2022-05-03 RX ORDER — ARIPIPRAZOLE 10 MG/1
TABLET ORAL
COMMUNITY
Start: 2022-05-02

## 2022-05-03 RX ORDER — BUSPIRONE HYDROCHLORIDE 10 MG/1
TABLET ORAL
COMMUNITY
Start: 2022-05-02

## 2022-05-03 NOTE — PROGRESS NOTES
Maren Hensley is a 23 y.o. female who presents here today for complaints of Irregular Menses (She states she usually gets her period when she's supposed to with her birth control pills but she had an episode of breakthrough bleeding. She states she was bleeding pretty heavily and bleeding through tampons.)    Heavy menses and intermenstrual bleeding. Vitals:  /62 (Site: Right Upper Arm, Position: Sitting, Cuff Size: Medium Adult)   Pulse 72   Ht 5' 10\" (1.778 m)   Wt 189 lb (85.7 kg)   BMI 27.12 kg/m²   Allergies:  Amoxicillin  No past medical history on file. No past surgical history on file. OB History    No obstetric history on file. Family History   Problem Relation Age of Onset    Cancer Mother     Diabetes Paternal Uncle      Social History     Socioeconomic History    Marital status: Single     Spouse name: Not on file    Number of children: Not on file    Years of education: Not on file    Highest education level: Not on file   Occupational History    Not on file   Tobacco Use    Smoking status: Never Smoker    Smokeless tobacco: Never Used   Substance and Sexual Activity    Alcohol use: No    Drug use: No    Sexual activity: Never   Other Topics Concern    Not on file   Social History Narrative    Not on file     Social Determinants of Health     Financial Resource Strain:     Difficulty of Paying Living Expenses: Not on file   Food Insecurity:     Worried About Running Out of Food in the Last Year: Not on file    Valerie of Food in the Last Year: Not on file   Transportation Needs:     Lack of Transportation (Medical): Not on file    Lack of Transportation (Non-Medical):  Not on file   Physical Activity:     Days of Exercise per Week: Not on file    Minutes of Exercise per Session: Not on file   Stress:     Feeling of Stress : Not on file   Social Connections:     Frequency of Communication with Friends and Family: Not on file    Frequency of Social Gatherings with Friends and Family: Not on file    Attends Holiness Services: Not on file    Active Member of Clubs or Organizations: Not on file    Attends Club or Organization Meetings: Not on file    Marital Status: Not on file   Intimate Partner Violence:     Fear of Current or Ex-Partner: Not on file    Emotionally Abused: Not on file    Physically Abused: Not on file    Sexually Abused: Not on file   Housing Stability:     Unable to Pay for Housing in the Last Year: Not on file    Number of [place] in the Last Year: Not on file    Unstable Housing in the Last Year: Not on file       Contraceptive method:  none    Patient's medications, allergies, past medical, surgical, social and family histories were reviewed and updated as appropriate. Review of Systems  As per chief complaint   All other systems reviewed and are negative. irregular bleeding   Physical Exam:  Vitals:  /62 (Site: Right Upper Arm, Position: Sitting, Cuff Size: Medium Adult)   Pulse 72   Ht 5' 10\" (1.778 m)   Wt 189 lb (85.7 kg)   BMI 27.12 kg/m²   Lungs: CTAB   Heart : Regular S1/S2, no M/R/G  Abdomen: Soft , NT, ND , + BS   Pelvic exam : deferred    Assessment:      Diagnosis Orders   1. Irregular bleeding  HCG, Quantitative, Pregnancy   2. Breakthrough bleeding on OCPs         Plan:     Breakthrough bleeding   HCG   Expectant management       Orders Placed This Encounter   Procedures    HCG, Quantitative, Pregnancy     Standing Status:   Future     Standing Expiration Date:   5/3/2023     No orders of the defined types were placed in this encounter. Follow Up:  Return if symptoms worsen or fail to improve, for next annual exam visit.         Julissa Boateng MD

## 2023-01-05 DIAGNOSIS — N94.6 DYSMENORRHEA: ICD-10-CM

## 2023-01-05 NOTE — TELEPHONE ENCOUNTER
Incoming fax from pharmacy requesting refill/90 day supply of Dasetta. Medication pending; Pharmacy verified.

## 2023-01-06 RX ORDER — NORETHINDRONE AND ETHINYL ESTRADIOL 7 DAYS X 3
KIT ORAL
Qty: 84 TABLET | Refills: 3 | Status: SHIPPED | OUTPATIENT
Start: 2023-01-06

## 2023-05-15 ENCOUNTER — OFFICE VISIT (OUTPATIENT)
Dept: PRIMARY CARE | Facility: CLINIC | Age: 28
End: 2023-05-15
Payer: COMMERCIAL

## 2023-05-15 VITALS
DIASTOLIC BLOOD PRESSURE: 80 MMHG | HEIGHT: 71 IN | OXYGEN SATURATION: 97 % | RESPIRATION RATE: 18 BRPM | WEIGHT: 152.6 LBS | TEMPERATURE: 98.4 F | BODY MASS INDEX: 21.36 KG/M2 | HEART RATE: 92 BPM | SYSTOLIC BLOOD PRESSURE: 110 MMHG

## 2023-05-15 DIAGNOSIS — Z00.00 ROUTINE ADULT HEALTH MAINTENANCE: ICD-10-CM

## 2023-05-15 DIAGNOSIS — J32.4 CHRONIC PANSINUSITIS: Primary | ICD-10-CM

## 2023-05-15 PROBLEM — M54.9 NOTALGIA: Status: ACTIVE | Noted: 2023-05-15

## 2023-05-15 PROBLEM — R55 FAINTING SPELL: Status: RESOLVED | Noted: 2023-05-15 | Resolved: 2023-05-15

## 2023-05-15 PROBLEM — R55 FAINTING SPELL: Status: ACTIVE | Noted: 2023-05-15

## 2023-05-15 PROBLEM — K21.9 GASTROESOPHAGEAL REFLUX DISEASE: Status: ACTIVE | Noted: 2023-05-15

## 2023-05-15 PROBLEM — F41.9 ANXIETY: Status: ACTIVE | Noted: 2023-05-15

## 2023-05-15 PROBLEM — F32.A DEPRESSION: Status: ACTIVE | Noted: 2023-05-15

## 2023-05-15 PROBLEM — F39 MOOD DISORDER (CMS-HCC): Status: ACTIVE | Noted: 2023-05-15

## 2023-05-15 PROBLEM — R10.9 ABDOMINAL PAIN, ACUTE: Status: ACTIVE | Noted: 2023-05-15

## 2023-05-15 PROBLEM — R05.9 COUGH: Status: ACTIVE | Noted: 2023-05-15

## 2023-05-15 PROBLEM — R01.1 CARDIAC MURMUR, PREVIOUSLY UNDIAGNOSED: Status: ACTIVE | Noted: 2023-05-15

## 2023-05-15 PROBLEM — R05.9 COUGH: Status: RESOLVED | Noted: 2023-05-15 | Resolved: 2023-05-15

## 2023-05-15 PROCEDURE — 1036F TOBACCO NON-USER: CPT | Performed by: FAMILY MEDICINE

## 2023-05-15 PROCEDURE — 99213 OFFICE O/P EST LOW 20 MIN: CPT | Performed by: FAMILY MEDICINE

## 2023-05-15 RX ORDER — DOXYCYCLINE 100 MG/1
100 CAPSULE ORAL 2 TIMES DAILY
Qty: 20 CAPSULE | Refills: 0 | Status: SHIPPED | OUTPATIENT
Start: 2023-05-15 | End: 2023-12-19 | Stop reason: ALTCHOICE

## 2023-05-15 RX ORDER — BUSPIRONE HYDROCHLORIDE 10 MG/1
1 TABLET ORAL 3 TIMES DAILY
COMMUNITY
Start: 2020-06-05 | End: 2023-06-23

## 2023-05-15 RX ORDER — TRAZODONE HYDROCHLORIDE 100 MG/1
1 TABLET ORAL NIGHTLY
COMMUNITY
Start: 2021-05-10 | End: 2023-06-23

## 2023-05-15 RX ORDER — DOXYCYCLINE 100 MG/1
100 CAPSULE ORAL 2 TIMES DAILY
COMMUNITY
End: 2023-05-15 | Stop reason: SDUPTHER

## 2023-05-15 RX ORDER — ARIPIPRAZOLE 10 MG/1
1 TABLET ORAL DAILY
COMMUNITY
Start: 2021-05-10 | End: 2023-06-23

## 2023-05-15 RX ORDER — NORETHINDRONE AND ETHINYL ESTRADIOL 7 DAYS X 3
1 KIT ORAL DAILY
COMMUNITY
Start: 2018-09-14

## 2023-05-15 ASSESSMENT — ENCOUNTER SYMPTOMS
NECK PAIN: 1
FACIAL SWELLING: 1
SINUS PAIN: 1
RHINORRHEA: 1
COUGH: 1
HEADACHES: 1
NECK STIFFNESS: 1
SINUS PRESSURE: 1

## 2023-05-15 NOTE — PROGRESS NOTES
"Subjective   Patient ID:  Jeny Graham is a 27 y.o. female patient who presents today for Sinusitis.     Past Medical, Surgical, and Family History reviewed and updated in chart.     Reviewed all medications by prescribing practitioner or clinical pharmacist (such as prescriptions, OTCs, herbal therapies and supplements) and documented in the medical record.     Sinusitis with cough: Patient presents today with chronic sinusitis. The patient states that they have the current clear rhinorrhea, congestion, cough, facial pain, nasal congestion, sinus pressure, sneezing, and sniffing, neck pain, neck pressure with an onset of several days ago. The patient has not been hospitalized for this in the last 6 months. They have tried the following medications Nasonex, Flonase, Claritin, Zyrtec, Singulair, Benadryl, oral decongestants, antihistamines with no relief. Patient denies any side effects to the medications.     Review of Systems   HENT:  Positive for congestion, facial swelling, postnasal drip, rhinorrhea, sinus pressure, sinus pain and sneezing.    Respiratory:  Positive for cough.    Musculoskeletal:  Positive for neck pain and neck stiffness.   Neurological:  Positive for headaches.       The patient denies having the following symptoms: chest pain, chest pressure, fever, chills, N/V/D, constipation, dizziness, SOB.    Patient Care Team:  Ryan Newton MD as PCP - General  Ryan Newton MD as PCP - [facility] PCP  Ryan Newton MD as PCP - [facility] PCP    Objective   Vitals:  /80   Pulse 92   Temp 36.9 °C (98.4 °F)   Resp 18   Ht 1.803 m (5' 11\")   Wt 69.2 kg (152 lb 9.6 oz)   SpO2 97%   BMI 21.28 kg/m²     Physical Exam  Vitals reviewed.   Constitutional:       Appearance: Normal appearance.   HENT:      Head:      Comments: Diffuse sinus area tenderness noted     Right Ear: Tympanic membrane and ear canal normal.      Left Ear: Tympanic membrane and ear canal normal.      Nose: " Congestion present.   Cardiovascular:      Rate and Rhythm: Normal rate and regular rhythm.      Heart sounds: Normal heart sounds.   Pulmonary:      Effort: No respiratory distress.      Breath sounds: Normal breath sounds.   Musculoskeletal:         General: Tenderness (right side of the neck) present.   Neurological:      Mental Status: She is alert.         Assessment/Plan   Problem List Items Addressed This Visit          Infectious/Inflammatory    Chronic pansinusitis - Primary    Current Assessment & Plan      This condition is poorly controlled, therapeutic changes necessary.              Follow up in: This patient will keep the previously scheduled follow-up appointment and schedule other follow-up if needed.     Scribe Attestation  By signing my name below, IHeather , Scrmariza   attest that this documentation has been prepared under the direction and in the presence of Ryan Newton MD.

## 2023-06-20 PROBLEM — N92.6 IRREGULAR MENSES: Status: ACTIVE | Noted: 2019-08-19

## 2023-06-20 PROBLEM — F32.1 MODERATE SINGLE CURRENT EPISODE OF MAJOR DEPRESSIVE DISORDER (MULTI): Status: ACTIVE | Noted: 2018-03-31

## 2023-06-23 DIAGNOSIS — F32.1 MODERATE SINGLE CURRENT EPISODE OF MAJOR DEPRESSIVE DISORDER (MULTI): ICD-10-CM

## 2023-06-23 DIAGNOSIS — F41.9 ANXIETY: ICD-10-CM

## 2023-06-23 RX ORDER — ARIPIPRAZOLE 10 MG/1
TABLET ORAL
Qty: 90 TABLET | Refills: 0 | Status: SHIPPED | OUTPATIENT
Start: 2023-06-23 | End: 2023-09-21

## 2023-06-23 RX ORDER — BUSPIRONE HYDROCHLORIDE 10 MG/1
TABLET ORAL
Qty: 270 TABLET | Refills: 0 | Status: SHIPPED | OUTPATIENT
Start: 2023-06-23 | End: 2023-09-21

## 2023-06-23 RX ORDER — TRAZODONE HYDROCHLORIDE 100 MG/1
TABLET ORAL
Qty: 90 TABLET | Refills: 0 | Status: SHIPPED | OUTPATIENT
Start: 2023-06-23 | End: 2023-08-15 | Stop reason: DRUGHIGH

## 2023-07-11 ENCOUNTER — LAB (OUTPATIENT)
Dept: LAB | Facility: LAB | Age: 28
End: 2023-07-11
Payer: COMMERCIAL

## 2023-07-11 DIAGNOSIS — Z00.00 ROUTINE ADULT HEALTH MAINTENANCE: ICD-10-CM

## 2023-07-11 LAB
ALANINE AMINOTRANSFERASE (SGPT) (U/L) IN SER/PLAS: 11 U/L (ref 7–45)
ALBUMIN (G/DL) IN SER/PLAS: 4.8 G/DL (ref 3.4–5)
ALKALINE PHOSPHATASE (U/L) IN SER/PLAS: 42 U/L (ref 33–110)
ANION GAP IN SER/PLAS: 13 MMOL/L (ref 10–20)
ASPARTATE AMINOTRANSFERASE (SGOT) (U/L) IN SER/PLAS: 12 U/L (ref 9–39)
BASOPHILS (10*3/UL) IN BLOOD BY AUTOMATED COUNT: 0.03 X10E9/L (ref 0–0.1)
BASOPHILS/100 LEUKOCYTES IN BLOOD BY AUTOMATED COUNT: 0.3 % (ref 0–2)
BILIRUBIN TOTAL (MG/DL) IN SER/PLAS: 0.8 MG/DL (ref 0–1.2)
CALCIUM (MG/DL) IN SER/PLAS: 9.5 MG/DL (ref 8.6–10.3)
CARBON DIOXIDE, TOTAL (MMOL/L) IN SER/PLAS: 27 MMOL/L (ref 21–32)
CHLORIDE (MMOL/L) IN SER/PLAS: 104 MMOL/L (ref 98–107)
CHOLESTEROL (MG/DL) IN SER/PLAS: 131 MG/DL (ref 0–199)
CHOLESTEROL IN HDL (MG/DL) IN SER/PLAS: 60.4 MG/DL
CHOLESTEROL/HDL RATIO: 2.2
CREATININE (MG/DL) IN SER/PLAS: 0.79 MG/DL (ref 0.5–1.05)
EOSINOPHILS (10*3/UL) IN BLOOD BY AUTOMATED COUNT: 0.04 X10E9/L (ref 0–0.7)
EOSINOPHILS/100 LEUKOCYTES IN BLOOD BY AUTOMATED COUNT: 0.5 % (ref 0–6)
ERYTHROCYTE DISTRIBUTION WIDTH (RATIO) BY AUTOMATED COUNT: 13.2 % (ref 11.5–14.5)
ERYTHROCYTE MEAN CORPUSCULAR HEMOGLOBIN CONCENTRATION (G/DL) BY AUTOMATED: 32.3 G/DL (ref 32–36)
ERYTHROCYTE MEAN CORPUSCULAR VOLUME (FL) BY AUTOMATED COUNT: 97 FL (ref 80–100)
ERYTHROCYTES (10*6/UL) IN BLOOD BY AUTOMATED COUNT: 4.09 X10E12/L (ref 4–5.2)
GFR FEMALE: >90 ML/MIN/1.73M2
GLUCOSE (MG/DL) IN SER/PLAS: 83 MG/DL (ref 74–99)
HEMATOCRIT (%) IN BLOOD BY AUTOMATED COUNT: 39.6 % (ref 36–46)
HEMOGLOBIN (G/DL) IN BLOOD: 12.8 G/DL (ref 12–16)
IMMATURE GRANULOCYTES/100 LEUKOCYTES IN BLOOD BY AUTOMATED COUNT: 0.2 % (ref 0–0.9)
LDL: 49 MG/DL (ref 0–99)
LEUKOCYTES (10*3/UL) IN BLOOD BY AUTOMATED COUNT: 8.9 X10E9/L (ref 4.4–11.3)
LYMPHOCYTES (10*3/UL) IN BLOOD BY AUTOMATED COUNT: 3 X10E9/L (ref 1.2–4.8)
LYMPHOCYTES/100 LEUKOCYTES IN BLOOD BY AUTOMATED COUNT: 33.9 % (ref 13–44)
MAGNESIUM (MG/DL) IN SER/PLAS: 1.95 MG/DL (ref 1.6–2.4)
MONOCYTES (10*3/UL) IN BLOOD BY AUTOMATED COUNT: 0.34 X10E9/L (ref 0.1–1)
MONOCYTES/100 LEUKOCYTES IN BLOOD BY AUTOMATED COUNT: 3.8 % (ref 2–10)
NEUTROPHILS (10*3/UL) IN BLOOD BY AUTOMATED COUNT: 5.42 X10E9/L (ref 1.2–7.7)
NEUTROPHILS/100 LEUKOCYTES IN BLOOD BY AUTOMATED COUNT: 61.3 % (ref 40–80)
PLATELETS (10*3/UL) IN BLOOD AUTOMATED COUNT: 215 X10E9/L (ref 150–450)
POTASSIUM (MMOL/L) IN SER/PLAS: 3.9 MMOL/L (ref 3.5–5.3)
PROTEIN TOTAL: 6.8 G/DL (ref 6.4–8.2)
SODIUM (MMOL/L) IN SER/PLAS: 140 MMOL/L (ref 136–145)
TRIGLYCERIDE (MG/DL) IN SER/PLAS: 108 MG/DL (ref 0–149)
UREA NITROGEN (MG/DL) IN SER/PLAS: 9 MG/DL (ref 6–23)
VLDL: 22 MG/DL (ref 0–40)

## 2023-07-11 PROCEDURE — 83735 ASSAY OF MAGNESIUM: CPT

## 2023-07-11 PROCEDURE — 85025 COMPLETE CBC W/AUTO DIFF WBC: CPT

## 2023-07-11 PROCEDURE — 80061 LIPID PANEL: CPT

## 2023-07-11 PROCEDURE — 36415 COLL VENOUS BLD VENIPUNCTURE: CPT

## 2023-07-11 PROCEDURE — 80053 COMPREHEN METABOLIC PANEL: CPT

## 2023-08-14 NOTE — PROGRESS NOTES
"Subjective   Patient ID: Ya Graham is a 27 y.o. female who presents for Follow-up, GERD, and Anxiety.    Anxiety:  The patient is presenting today for a follow up of anxiety disorder. She denies having any current suicidal and/or homicidal ideation.  Patient denies any side effects to the medications.     [place] report and controlled substance form reviewed.    Depression: Patient is presenting today for a follow up of depression. She voices that they are doing adequately. The patient is compliant with medications. Patient denies any side effects to the medications. She voices that there have been a great deal of stressors in her life at the moment. Work life is stressful. Patient confirms that she is taking her medications.     GERD  The patient presents today for a follow up of GERD. The patient states that they Yes previous hx of abdominal pain.       The patient denies having the following symptoms: chest pain, chest pressure, fever, chills, N/V/D, constipation, dizziness, headaches, SOB.      Objective   Vitals:  /68   Pulse 98   Temp 36.3 °C (97.3 °F)   Resp 16   Ht 1.803 m (5' 11\")   Wt 70.8 kg (156 lb)   SpO2 97%   BMI 21.76 kg/m²     Physical Exam  Vitals reviewed.   Constitutional:       Appearance: Normal appearance.   HENT:      Right Ear: Tympanic membrane and ear canal normal.      Left Ear: Tympanic membrane and ear canal normal.      Mouth/Throat:      Pharynx: Oropharynx is clear.   Eyes:      Extraocular Movements: Extraocular movements intact.      Conjunctiva/sclera: Conjunctivae normal.      Pupils: Pupils are equal, round, and reactive to light.   Cardiovascular:      Rate and Rhythm: Normal rate and regular rhythm.      Heart sounds: Normal heart sounds.   Pulmonary:      Effort: Pulmonary effort is normal. No respiratory distress.      Breath sounds: Normal breath sounds.   Abdominal:      General: There is no distension.      Palpations: There is no mass.      Tenderness: " There is no abdominal tenderness. There is no guarding or rebound.      Hernia: No hernia is present.   Musculoskeletal:      Cervical back: Neck supple.   Skin:     General: Skin is warm and dry.   Neurological:      Mental Status: She is alert and oriented to person, place, and time.          Labs reviewed from :  07/11/23 CMP, CBC, Lipid,  WNL.       Assessment/Plan   Problem List Items Addressed This Visit       Anxiety - Primary      Is well controlled, continue with current medications.          Gastroesophageal reflux disease      Is stable, continue with current treatment.          Moderate single current episode of major depressive disorder (CMS/HCC)      Is present and symptomatic, will need treatment.          Relevant Medications    traZODone (Desyrel) 150 mg tablet    Other Relevant Orders    Follow Up In [facility] - PCP - Established       Follow up in: 3 months or sooner if needed with labs prior.     Scribe Attestation  By signing my name below, IHeather , Arash   attest that this documentation has been prepared under the direction and in the presence of Ryan Nweton MD.

## 2023-08-15 ENCOUNTER — OFFICE VISIT (OUTPATIENT)
Dept: PRIMARY CARE | Facility: CLINIC | Age: 28
End: 2023-08-15
Payer: COMMERCIAL

## 2023-08-15 VITALS
RESPIRATION RATE: 16 BRPM | OXYGEN SATURATION: 97 % | HEART RATE: 98 BPM | DIASTOLIC BLOOD PRESSURE: 68 MMHG | TEMPERATURE: 97.3 F | WEIGHT: 156 LBS | HEIGHT: 71 IN | BODY MASS INDEX: 21.84 KG/M2 | SYSTOLIC BLOOD PRESSURE: 104 MMHG

## 2023-08-15 DIAGNOSIS — K21.9 GASTROESOPHAGEAL REFLUX DISEASE WITHOUT ESOPHAGITIS: ICD-10-CM

## 2023-08-15 DIAGNOSIS — F32.1 MODERATE SINGLE CURRENT EPISODE OF MAJOR DEPRESSIVE DISORDER (MULTI): ICD-10-CM

## 2023-08-15 DIAGNOSIS — F41.9 ANXIETY: Primary | ICD-10-CM

## 2023-08-15 PROCEDURE — 1036F TOBACCO NON-USER: CPT | Performed by: FAMILY MEDICINE

## 2023-08-15 PROCEDURE — 99213 OFFICE O/P EST LOW 20 MIN: CPT | Performed by: FAMILY MEDICINE

## 2023-08-15 RX ORDER — TRAZODONE HYDROCHLORIDE 150 MG/1
150 TABLET ORAL NIGHTLY PRN
Qty: 30 TABLET | Refills: 5 | Status: SHIPPED | OUTPATIENT
Start: 2023-08-15 | End: 2024-08-14

## 2023-08-15 RX ORDER — TRAZODONE HYDROCHLORIDE 150 MG/1
100 TABLET ORAL NIGHTLY
Qty: 90 TABLET | Refills: 3 | Status: CANCELLED | OUTPATIENT
Start: 2023-08-15

## 2023-08-15 ASSESSMENT — PATIENT HEALTH QUESTIONNAIRE - PHQ9
SUM OF ALL RESPONSES TO PHQ9 QUESTIONS 1 AND 2: 0
2. FEELING DOWN, DEPRESSED OR HOPELESS: NOT AT ALL
1. LITTLE INTEREST OR PLEASURE IN DOING THINGS: NOT AT ALL

## 2023-09-21 DIAGNOSIS — F41.9 ANXIETY: ICD-10-CM

## 2023-09-21 DIAGNOSIS — F32.1 MODERATE SINGLE CURRENT EPISODE OF MAJOR DEPRESSIVE DISORDER (MULTI): ICD-10-CM

## 2023-09-21 RX ORDER — BUSPIRONE HYDROCHLORIDE 10 MG/1
TABLET ORAL
Qty: 270 TABLET | Refills: 3 | Status: SHIPPED | OUTPATIENT
Start: 2023-09-21 | End: 2023-12-19 | Stop reason: SDUPTHER

## 2023-09-21 RX ORDER — ARIPIPRAZOLE 10 MG/1
TABLET ORAL
Qty: 90 TABLET | Refills: 3 | Status: SHIPPED | OUTPATIENT
Start: 2023-09-21 | End: 2023-12-19 | Stop reason: WASHOUT

## 2023-09-21 NOTE — TELEPHONE ENCOUNTER
Recent Visits  Date Type Provider Dept   08/15/23 Office Visit Ryan Newton MD Do [id number]   05/15/23 Office Visit Ryan Newton MD Do [id number]   Showing recent visits within past 540 days and meeting all other requirements  Future Appointments  Date Type Provider Dept   11/14/23 Appointment Ryan Newton MD Do [id number] [facility]   Showing future appointments within next 180 days and meeting all other requirements

## 2023-11-14 ENCOUNTER — APPOINTMENT (OUTPATIENT)
Dept: PRIMARY CARE | Facility: CLINIC | Age: 28
End: 2023-11-14
Payer: COMMERCIAL

## 2023-11-21 DIAGNOSIS — N94.6 DYSMENORRHEA: ICD-10-CM

## 2023-11-24 RX ORDER — NORETHINDRONE AND ETHINYL ESTRADIOL 7 DAYS X 3
KIT ORAL
Qty: 84 TABLET | Refills: 3 | Status: SHIPPED | OUTPATIENT
Start: 2023-11-24

## 2023-11-24 NOTE — TELEPHONE ENCOUNTER
Requested Prescriptions     Pending Prescriptions Disp Refills    norethindrone-ethinyl estradiol (NORTREL 7/7/7) 0.5/0.75/1-35 MG-MCG per tablet [Pharmacy Med Name: NORETH/ETH ES(NORTREL)TAB 28S 7/7/7] 84 tablet 3     Sig: TAKE 1 TABLET DAILY     Last OV 12/21/21

## 2023-12-05 ENCOUNTER — APPOINTMENT (OUTPATIENT)
Dept: PRIMARY CARE | Facility: CLINIC | Age: 28
End: 2023-12-05
Payer: COMMERCIAL

## 2023-12-19 ENCOUNTER — OFFICE VISIT (OUTPATIENT)
Dept: PRIMARY CARE | Facility: CLINIC | Age: 28
End: 2023-12-19
Payer: COMMERCIAL

## 2023-12-19 VITALS
TEMPERATURE: 98.5 F | WEIGHT: 174.6 LBS | DIASTOLIC BLOOD PRESSURE: 66 MMHG | RESPIRATION RATE: 14 BRPM | BODY MASS INDEX: 24.44 KG/M2 | HEIGHT: 71 IN | SYSTOLIC BLOOD PRESSURE: 100 MMHG

## 2023-12-19 DIAGNOSIS — F41.9 ANXIETY: Primary | ICD-10-CM

## 2023-12-19 DIAGNOSIS — F32.1 MODERATE SINGLE CURRENT EPISODE OF MAJOR DEPRESSIVE DISORDER (MULTI): ICD-10-CM

## 2023-12-19 PROCEDURE — 99214 OFFICE O/P EST MOD 30 MIN: CPT | Performed by: FAMILY MEDICINE

## 2023-12-19 PROCEDURE — 1036F TOBACCO NON-USER: CPT | Performed by: FAMILY MEDICINE

## 2023-12-19 RX ORDER — ARIPIPRAZOLE 15 MG/1
15 TABLET ORAL DAILY
Qty: 30 TABLET | Refills: 1 | Status: SHIPPED | OUTPATIENT
Start: 2023-12-19 | End: 2024-02-17

## 2023-12-19 RX ORDER — BUSPIRONE HYDROCHLORIDE 15 MG/1
15 TABLET ORAL 3 TIMES DAILY
Qty: 90 TABLET | Refills: 1 | Status: SHIPPED | OUTPATIENT
Start: 2023-12-19

## 2023-12-19 NOTE — PROGRESS NOTES
"Subjective    Patient ID: Jeny Romerocomb \"Oumar" is a 27 y.o. female who presents for Anxiety.     Anxiety:  The patient is presenting today for a follow up of anxiety disorder. She denies having any current suicidal and/or homicidal ideation.  Pt would like to discuss taking a new medication. She voices that the medication buspirone 10 mg is not alleviating her anxiety enough. The patient has had multiple panic attacks while at work.    Depression: Patient is presenting today for a follow up of depression. She voices that they are doing marginally. Patient is taking trazodone 150 mg tablet at bedtime and Abilify 10 mg tablet to help with depression and insomnia.    The patient denies having the following symptoms: chest pain, chest pressure, fever, chills, N/V/D, constipation, dizziness, headaches, SOB.    Objective   Vitals:  /66   Temp 36.9 °C (98.5 °F)   Resp 14   Ht 1.803 m (5' 11\")   Wt 79.2 kg (174 lb 9.6 oz)   BMI 24.35 kg/m²     Physical Exam  Vitals reviewed.   Constitutional:       Appearance: Normal appearance.   Neck:      Vascular: No carotid bruit.   Cardiovascular:      Rate and Rhythm: Normal rate and regular rhythm.      Pulses: Normal pulses.      Heart sounds: Normal heart sounds.   Pulmonary:      Effort: Pulmonary effort is normal. No respiratory distress.      Breath sounds: Normal breath sounds. No wheezing.   Abdominal:      General: There is no distension.      Palpations: Abdomen is soft. There is no mass.      Tenderness: There is no abdominal tenderness. There is no right CVA tenderness, left CVA tenderness, guarding or rebound.   Musculoskeletal:      Cervical back: Normal range of motion and neck supple. No rigidity.      Right lower leg: No edema.      Left lower leg: No edema.   Lymphadenopathy:      Cervical: No cervical adenopathy.   Neurological:      Mental Status: She is alert.          Labs active- future.     Assessment/Plan   Problem List Items Addressed This " Visit       Anxiety - Primary     Poorly controlled, increase buspirone to 15 mg tid and increase aripiprazole from 10 to 15 mg daily.  Pt has an appt with psychiatry at end of January.         Relevant Medications    ARIPiprazole (Abilify) 15 mg tablet    busPIRone (Buspar) 15 mg tablet    Other Relevant Orders    Follow Up In [facility] - PCP - Established    Moderate single current episode of major depressive disorder (CMS/HCC)      This condition is poorly controlled, therapeutic changes necessary. Stop taking Abilify and start Vraylar          Relevant Medications    busPIRone (Buspar) 15 mg tablet    Other Relevant Orders    CBC and Auto Differential    Comprehensive Metabolic Panel    Lipid Panel    Thyroid Stimulating Hormone         Follow up in: 2 month(s) depression and anxiety or sooner if needed with labs prior.     Scribe Attestation  By signing my name below, I, Arash Orozco   attest that this documentation has been prepared under the direction and in the presence of Ryan Newton MD.

## 2023-12-19 NOTE — ASSESSMENT & PLAN NOTE
This condition is poorly controlled, therapeutic changes necessary. Stop taking Abilify and start Vraylar

## 2023-12-19 NOTE — ASSESSMENT & PLAN NOTE
Poorly controlled, increase buspirone to 15 mg tid and increase aripiprazole from 10 to 15 mg daily.  Pt has an appt with psychiatry at end of January.

## 2024-02-13 ENCOUNTER — APPOINTMENT (OUTPATIENT)
Dept: PRIMARY CARE | Facility: CLINIC | Age: 29
End: 2024-02-13
Payer: COMMERCIAL

## 2024-02-20 ENCOUNTER — APPOINTMENT (OUTPATIENT)
Dept: PRIMARY CARE | Facility: CLINIC | Age: 29
End: 2024-02-20
Payer: COMMERCIAL

## 2024-03-08 ENCOUNTER — TELEPHONE (OUTPATIENT)
Dept: PRIMARY CARE | Facility: CLINIC | Age: 29
End: 2024-03-08
Payer: COMMERCIAL

## 2024-03-08 NOTE — TELEPHONE ENCOUNTER
Dr. Newton asked that I call the patient and cancel her appointment on 3/12/24. She has canceled 8 of her previous appointments. This is usually done same day. Dr. Newton does not want the patient to schedule with him.    Left message in forming the patient.

## 2024-03-12 ENCOUNTER — APPOINTMENT (OUTPATIENT)
Dept: PRIMARY CARE | Facility: CLINIC | Age: 29
End: 2024-03-12
Payer: COMMERCIAL

## 2024-10-22 DIAGNOSIS — N94.6 DYSMENORRHEA: ICD-10-CM

## 2024-10-23 RX ORDER — NORETHINDRONE AND ETHINYL ESTRADIOL 7 DAYS X 3
KIT ORAL
Qty: 84 TABLET | Refills: 3 | OUTPATIENT
Start: 2024-10-23

## 2025-04-22 ENCOUNTER — APPOINTMENT (OUTPATIENT)
Dept: OBSTETRICS AND GYNECOLOGY | Facility: CLINIC | Age: 30
End: 2025-04-22
Payer: COMMERCIAL

## 2025-04-22 VITALS
SYSTOLIC BLOOD PRESSURE: 100 MMHG | DIASTOLIC BLOOD PRESSURE: 66 MMHG | HEIGHT: 71 IN | WEIGHT: 156.9 LBS | BODY MASS INDEX: 21.97 KG/M2

## 2025-04-22 DIAGNOSIS — L68.0 HIRSUTISM: ICD-10-CM

## 2025-04-22 DIAGNOSIS — N93.9 ABNORMAL UTERINE BLEEDING (AUB): Primary | ICD-10-CM

## 2025-04-22 PROBLEM — N92.6 IRREGULAR MENSES: Status: RESOLVED | Noted: 2019-08-19 | Resolved: 2025-04-22

## 2025-04-22 PROBLEM — R10.9 ABDOMINAL PAIN, ACUTE: Status: RESOLVED | Noted: 2023-05-15 | Resolved: 2025-04-22

## 2025-04-22 PROCEDURE — 99204 OFFICE O/P NEW MOD 45 MIN: CPT | Performed by: OBSTETRICS & GYNECOLOGY

## 2025-04-22 PROCEDURE — 1036F TOBACCO NON-USER: CPT | Performed by: OBSTETRICS & GYNECOLOGY

## 2025-04-22 PROCEDURE — 3008F BODY MASS INDEX DOCD: CPT | Performed by: OBSTETRICS & GYNECOLOGY

## 2025-04-22 RX ORDER — MEDROXYPROGESTERONE ACETATE 10 MG/1
10 TABLET ORAL DAILY
Qty: 30 TABLET | Refills: 0 | Status: SHIPPED | OUTPATIENT
Start: 2025-04-22 | End: 2025-05-22

## 2025-04-22 NOTE — PROGRESS NOTES
"GYNECOLOGY PROGRESS NOTE          CC:     Chief Complaint   Patient presents with    New Patient Visit     Patient is here today to establish a gyn doctor.  Also the issues she is having is irregular periods.  Patient mentioned that she was on BC pill for 10 yrs and stopped taking it 2024 - has regular periods until 2025 and irregular periods started.  Patient also mentioned that she also noticed increase in body hair.       HPI:  Jeny Graham is here with complaint of AUB.    Patient is premenopausal.  Patient has a history of irregular cycles where she would believe every couple weeks and then she was put on birth control pills.  She was on birth control pills for 10 years.  She stopped them last  as her partner had a vasectomy due to concerns with health risks with long-term OCP use (VTE, cancer).  Her periods were fairly regular off the OCPs until January and then she started again having periods every couple of weeks.  Her periods are moderately painful and they are of a slightly lighter flow than normal, but are otherwise like a normaql period.  She has noticed increased dark long hair growth on her areola B/L which is new for her, no facial dark hair growth or acne.  She does note some occasional feelings of feeling overly hot when she stands up, she denies any nipple discharge or weight gain.   0, no pregnancy plans ever.  Last Pap smear normal in approximately 2019 per patient.    The patient has no history of HGSIL.  Domestic violence screen:  negative.       ROS:  URO - no hematuria  GI - no blood in BMs  GYN - see HPI  ENDO - see HPI  DERM - see HPI      HISTORY:  Past Surgical History:  No date: NO PAST SURGERIES  Social History[1]  Cancer-related family history includes Ovarian cancer in her mother.       PHYSICAL EXAM:  /66 (BP Location: Left arm, Patient Position: Sitting)   Ht 1.803 m (5' 11\")   Wt 71.2 kg (156 lb 14.4 oz)   LMP 2025   BMI 21.88 kg/m²   GEN:  " A&O, NAD  HEENT:  head HC/AT, no visible goiter  ABD:  NT/ND, soft, no palpable masses  GYN:  deferred to next visit due to menses  DERM:  no facial hirsutism or acne, no abdominal dark hair growth, no acanthosis nigra cans  PSYCH:  normal affect, non-anxious      IMPRESSION/PLAN:  Problem List Items Addressed This Visit    None  Visit Diagnoses         Abnormal uterine bleeding (AUB)    -  Primary    Relevant Orders    DHEA-Sulfate    Testosterone,Free and Total    Insulin, Free And Total    Progesterone    TSH with reflex to Free T4 if abnormal    FSH & LH    Estradiol    Pelvic Ultrasound      Hirsutism        Relevant Orders    DHEA-Sulfate    Testosterone,Free and Total           AUB:  Chronic/recurrent.  Discussed with the patient the potential etiologies for her AUB.  Suspected diagnosis is anovulatory cycles.  Labs and pelvic ultrasound ordered.  Will do 30-day course of Provera to stop bleeding so that we can have patient complete her labs and ultrasound and have a follow-up exam for a Pap smear.  Afterwards will either consider restarting OCPs versus cyclic progesterone withdrawal bleeds.    Hirsutism:  New.  Isolated to areola, no facial dark hair growth or acne.  Labs ordered---testosterone and DHEAS levels.  Unlikely to be familial given lack of a [place] or Mediterranean descent.  Likely related to stopping OCPs which lower the sex, binding blocking level which can lower the body's natural testosterone level.  May need to consider Rx Spironolactone if patient does not want to go back on birth control pills.        F/U in 1 month for lab and US results and for pap smear testing.      Cole Jones DO         [1]   Social History  Tobacco Use    Smoking status: Never    Smokeless tobacco: Never   Vaping Use    Vaping status: Every Day   Substance Use Topics    Alcohol use: Never    Drug use: Yes     Types: Marijuana

## 2025-04-27 LAB
DHEA-S SERPL-MCNC: 484 MCG/DL (ref 14–349)
ESTRADIOL SERPL-MCNC: 79 PG/ML
FSH SERPL-ACNC: 11.6 MIU/ML
INSULIN FREE SERPL-ACNC: NORMAL U[IU]/ML
INSULIN SERPL-ACNC: 9.7 UIU/ML
LH SERPL-ACNC: 21.7 MIU/ML
PROGEST SERPL-MCNC: 3 NG/ML
TESTOST FREE SERPL-MCNC: 4.1 PG/ML (ref 0.1–6.4)
TESTOST SERPL-MCNC: 53 NG/DL (ref 2–45)
TSH SERPL-ACNC: 0.68 MIU/L

## 2025-05-07 LAB
DHEA-S SERPL-MCNC: 484 MCG/DL (ref 14–349)
ESTRADIOL SERPL-MCNC: 79 PG/ML
FSH SERPL-ACNC: 11.6 MIU/ML
INSULIN FREE SERPL-ACNC: 7.1 UIU/ML (ref 1.5–14.9)
INSULIN SERPL-ACNC: 9.7 UIU/ML
LH SERPL-ACNC: 21.7 MIU/ML
PROGEST SERPL-MCNC: 3 NG/ML
TESTOST FREE SERPL-MCNC: 4.1 PG/ML (ref 0.1–6.4)
TESTOST SERPL-MCNC: 53 NG/DL (ref 2–45)
TSH SERPL-ACNC: 0.68 MIU/L

## 2025-05-13 ENCOUNTER — APPOINTMENT (OUTPATIENT)
Dept: OBSTETRICS AND GYNECOLOGY | Facility: CLINIC | Age: 30
End: 2025-05-13
Payer: COMMERCIAL

## 2025-05-13 VITALS
WEIGHT: 156.4 LBS | SYSTOLIC BLOOD PRESSURE: 100 MMHG | BODY MASS INDEX: 21.9 KG/M2 | HEIGHT: 71 IN | DIASTOLIC BLOOD PRESSURE: 64 MMHG

## 2025-05-13 DIAGNOSIS — Z12.4 SCREENING FOR CERVICAL CANCER: ICD-10-CM

## 2025-05-13 DIAGNOSIS — L68.0 HIRSUTISM: Primary | ICD-10-CM

## 2025-05-13 DIAGNOSIS — N93.9 ABNORMAL UTERINE BLEEDING (AUB): ICD-10-CM

## 2025-05-13 DIAGNOSIS — E28.2 PCOS (POLYCYSTIC OVARIAN SYNDROME): ICD-10-CM

## 2025-05-13 DIAGNOSIS — R79.89 ELEVATED TESTOSTERONE LEVEL IN FEMALE: ICD-10-CM

## 2025-05-13 PROCEDURE — 3008F BODY MASS INDEX DOCD: CPT | Performed by: OBSTETRICS & GYNECOLOGY

## 2025-05-13 PROCEDURE — 99214 OFFICE O/P EST MOD 30 MIN: CPT | Performed by: OBSTETRICS & GYNECOLOGY

## 2025-05-13 NOTE — PROGRESS NOTES
GYNECOLOGY VISIT PROGRESS NOTE          CC:     Chief Complaint   Patient presents with    Follow-up     Est patient - here for FU from last visit - and a pap test - Patient sts since on Provera the bleeding took 4 days to stop and no bleeding since, feels different and better - no random spotting, no pain.  Chaperone kendal raymundo ma        HPI:  Jeny Graham is scheduled today for visit to discuss lab test results for AUB.        Patient states that she is feeling much better since starting the Provera Rx.  She stopped bleeding after 4 days of use.  She is interested in restarting OCPs, her prior OCP with Dasetta (monophasic version).  Her dark hair growth is limited to her nipples, she does not have any dark facial hair growth or facial acne      ROS:  GYN - see HPI  ENDO - see HPI      PHYSICAL EXAM:  VS:  n/a (virtual visit)  GEN:  A&O, NAD  PSYCH:  normal affect, non-anxious    INSULIN, FREE (BIOACTIVE)   Date Value Ref Range Status   04/22/2025 7.1 1.5 - 14.9 uIU/mL Final     Comment:        Insulin levels vary widely in specimens taken from  non-fasting individuals.     Insulin analogues may demonstrate non-linear  cross-reactivity in this assay. Interpret results  accordingly.       DHEA SULFATE   Date Value Ref Range Status   04/22/2025 484 (H) 14 - 349 mcg/dL Final     TESTOSTERONE, TOTAL, MS   Date Value Ref Range Status   04/22/2025 53 (H) 2 - 45 ng/dL Final     Comment:        For additional information, please refer to  [email or web address]/  [email or web address]  (This link is being provided for informational/  educational purposes only.)     This test was developed and its analytical performance  characteristics have been determined by [employer] [place] [place]. It has  not been cleared or approved by the U.S. Food and Drug  Administration. This assay has been validated pursuant  to the CLIA regulations and is used for clinical  purposes.           FSH   Date Value Ref Range Status   04/22/2025 11.6 mIU/mL Final     Comment:                         Reference Range                        Follicular Phase       2.5-10.2               Mid-cycle Peak         3.1-17.7               Luteal Phase           1.5- 9.1               Postmenopausal       23.0-116.3                                   LH   Date Value Ref Range Status   04/22/2025 21.7 mIU/mL Final     Comment:                Reference Range             Follicular Phase  1.9-12.5             Mid-Cycle Peak    8.7-76.3             Luteal Phase      0.5-16.9             Postmenopausal    10.0-54.7       ESTRADIOL   Date Value Ref Range Status   04/22/2025 79 pg/mL Final     Comment:           Reference Range          Follicular Phase:              Mid-Cycle:                     Luteal Phase:                  Postmenopausal:      < or = 31            Reference range established on post-pubertal patient  population. No pre-pubertal reference range  established using this assay. For any patients for  whom low Estradiol levels are anticipated (e.g. males,  pre-pubertal children and hypogonadal/post-menopausal   females), the [facility]  Estradiol, Ultrasensitive, LCMSMS assay is recommended  (order code 70122).      Please note: patients being treated with the drug   fulvestrant (Faslodex(R)) have demonstrated significant   interference in immunoassay methods for estradiol   measurement. The cross reactivity could lead to falsely   elevated estradiol test results leading to an   inappropriate clinical assessment of estrogen status.  [facility] order code 30289-Estradiol,   Ultrasensitive LC/MS/MS demonstrates negligible cross   reactivity with fulvestrant.       PROGESTERONE   Date Value Ref Range Status   04/22/2025 3.0 ng/mL Final     Comment:                 Reference Ranges           Female              Follicular Phase     < 1.0              Luteal Phase       2.6-21.5              Post menopausal      < 0.5              Pregnancy              1st Trimester     4.1-34.0              2nd Trimester    24.0-76.0              3rd Trimester   52.0-302.0           IMPRESSION/PLAN:  Problem List Items Addressed This Visit       Abnormal uterine bleeding (AUB)    Hirsutism - Primary     Other Visit Diagnoses         Screening for cervical cancer        Relevant Orders    THINPREP PAP TEST      PCOS (polycystic ovarian syndrome)        Relevant Orders    US PELVIS TRANSABDOMINAL WITH TRANSVAGINAL    DHEA-Sulfate    Testosterone,Free and Total      Elevated testosterone level in female        Relevant Orders    US PELVIS TRANSABDOMINAL WITH TRANSVAGINAL    DHEA-Sulfate    Testosterone,Free and Total           Pap done today, next due in 3 years (2028) per [facility]CCP screening guidelines.    AUB:  Anovulatory cycles (AUB-O) due to PCOS.   Lab results with mildly elevated testosterone and DHEA-S levels, normal insulin level leukocytosis.  Pelvic US ordered..  Ovarian findings of fibroids.  Offered patient cyclic Provera with possible versus restarting the symptoms, patient opts for restarting of OCPs.      PCOS:  Patient has anovulatory cycles and clinical as well as laboratory evidence of elevated testosterone levels.  Given this meets sufficien--even without the US completed yet--Rottingham criteria for diagnosis of PCOS.  Long-term health implications related to PCOS reviewed with patient--DM, HLD, AUB, infertility.  If endometrium normal on US then will defer any endometrial sampling given short duration of AUB symptoms and normal BMI.  Given normal BMI and normal insulin level will defer Glucophage treatment.  Yearly screening for DM/lipids via PCP recommended.  Will repeat testosterone levels in 6 months.      Hirsutism:  Mildly elevated testosterone and DHEA-S levels.  Related to PCOS.  Offered restart of OCPs versus spironolactone Rx (in combination with cyclic progesterone  withdrawal bleeds)--patient opts for restart of OCPs.  Will repeat labs levels in 6 months.  If DHEAS level does not come down on OCPs will consider adrenal imaging, but not significantly elevated on initial testing to order imaging now.    OCP Rx:  Rx for Dasetta provided, use/AE reviewed, no contraindications to use, no VTE risk factors.  Long-term OCP use safety profile discussed with the patient previously at last visit.        Office will call with US results and 6-month repeat lab results.      Cole Jones, DO

## 2025-05-19 DIAGNOSIS — Z30.011 ORAL CONTRACEPTIVE PRESCRIBED: Primary | ICD-10-CM

## 2025-05-19 RX ORDER — NORETHINDRONE AND ETHINYL ESTRADIOL 7 DAYS X 3
1 KIT ORAL DAILY
Qty: 28 TABLET | Refills: 11 | Status: SHIPPED | OUTPATIENT
Start: 2025-05-19

## 2025-05-27 ENCOUNTER — HOSPITAL ENCOUNTER (OUTPATIENT)
Dept: RADIOLOGY | Facility: HOSPITAL | Age: 30
Discharge: HOME | End: 2025-05-27
Payer: COMMERCIAL

## 2025-05-27 DIAGNOSIS — E28.2 PCOS (POLYCYSTIC OVARIAN SYNDROME): ICD-10-CM

## 2025-05-27 DIAGNOSIS — R79.89 ELEVATED TESTOSTERONE LEVEL IN FEMALE: ICD-10-CM

## 2025-05-27 PROCEDURE — 76830 TRANSVAGINAL US NON-OB: CPT

## 2025-05-27 PROCEDURE — 76830 TRANSVAGINAL US NON-OB: CPT | Performed by: RADIOLOGY

## 2025-05-27 PROCEDURE — 76856 US EXAM PELVIC COMPLETE: CPT | Performed by: RADIOLOGY

## 2025-06-11 ENCOUNTER — TELEPHONE (OUTPATIENT)
Dept: OBSTETRICS AND GYNECOLOGY | Facility: CLINIC | Age: 30
End: 2025-06-11
Payer: COMMERCIAL

## 2025-06-11 LAB
CYTOLOGY CMNT CVX/VAG CYTO-IMP: NORMAL
HPV HR 12 DNA GENITAL QL NAA+PROBE: POSITIVE
HPV HR GENOTYPES PNL CVX NAA+PROBE: POSITIVE
HPV16 DNA SPEC QL NAA+PROBE: NEGATIVE
HPV18 DNA SPEC QL NAA+PROBE: NEGATIVE
LAB AP HPV GENOTYPE QUESTION: YES
LAB AP HPV HR: NORMAL
LABORATORY COMMENT REPORT: NORMAL
LMP START DATE: NORMAL
PATH REPORT.TOTAL CANCER: NORMAL

## 2025-06-11 NOTE — TELEPHONE ENCOUNTER
Spoke with patient and made her aware of the results and scheduled for procedure as recommended by Dr. Jones.  Reviewed and approved by LUKASZ RAMOS on 6/11/25 at 8:39 AM.

## 2025-06-11 NOTE — TELEPHONE ENCOUNTER
----- Message from Cole Jones sent at 6/11/2025  6:44 AM EDT -----  Call patient and let her know that her Pap smear was abnormal when she needs to colonoscopy scheduled. Pap results  ascus positive other high risk HPV  ----- Message -----  From: Lab, Background User  Sent: 6/11/2025   6:14 AM EDT  To: Cole Jones, DO

## 2025-07-07 ENCOUNTER — APPOINTMENT (OUTPATIENT)
Dept: OBSTETRICS AND GYNECOLOGY | Facility: CLINIC | Age: 30
End: 2025-07-07
Payer: COMMERCIAL

## 2025-07-07 VITALS
SYSTOLIC BLOOD PRESSURE: 114 MMHG | BODY MASS INDEX: 21.19 KG/M2 | DIASTOLIC BLOOD PRESSURE: 82 MMHG | WEIGHT: 151.4 LBS | HEIGHT: 71 IN

## 2025-07-07 DIAGNOSIS — R87.610 ASCUS WITH POSITIVE HIGH RISK HPV CERVICAL: ICD-10-CM

## 2025-07-07 DIAGNOSIS — R87.810 ASCUS WITH POSITIVE HIGH RISK HPV CERVICAL: ICD-10-CM

## 2025-07-07 DIAGNOSIS — Z71.85 HPV VACCINE COUNSELING: Primary | ICD-10-CM

## 2025-07-07 LAB — PREGNANCY TEST URINE, POC: NEGATIVE

## 2025-07-07 PROCEDURE — 81025 URINE PREGNANCY TEST: CPT | Performed by: OBSTETRICS & GYNECOLOGY

## 2025-07-07 PROCEDURE — 57454 BX/CURETT OF CERVIX W/SCOPE: CPT | Performed by: OBSTETRICS & GYNECOLOGY

## 2025-07-07 NOTE — PROGRESS NOTES
"GYNECOLOGY PROGRESS NOTE          CC:     Chief Complaint   Patient presents with    Procedure     Colpo procedure  Pap 2/2025 ASCUS w/HPV +  Preg test neg  Chaperone kendal raymundo ma          HPI:  Jeny Graham is scheduled today for COLPO for abnormal Pap smear.      Pap results:  ASCUS, + other HR HPV.  Last pap \"wnl\" in 2019 (no records).   No new GYN complaints.  No PCB or vaginal discharge.  Patient very nervous about the colposcopy procedure, no Hx of sexual assault.  She is very upset about the + HPV diagnosis.  She states that she has always worn a condom with her 3 prior lifetime partners when 1st together, monogamous relationships.    Gardasil:  yes?  Smoker:   no      ROS:  GYN - see HPI  PSYCH - very anxious/tearful      HISTORY:  Past Surgical History:  No date: NO PAST SURGERIES  Social History[1]        PHYSICAL EXAM:  GEN:  A&O, NAD  URO:  normal urethral meatus  GYN:  normal vulva and perineum w/o lesions or ulcers, normal vagina without discharge or lesions, grossly normal cervix without lesions or discharge----SEE COLPO MAP  PSYCH:  normal affect, non-anxious      BLUE=TZ  GREEN=partial/negative iodine uptake  RED=biopsy sites        ----------------------------------------------------------------------------    COLPOSCOPY PROCEDURE NOTE    Patient was consented, risks of procedure reviewed.  Patient was placed in lithotomy position and a speculum was placed.  The cervix was fully visualized and was cleansed with saline.  Visual inspection was performed and the transformation zone was fully visualized.  Acetic acid was placed on the cervix and any aceto-white changes were noted, no atypical lesions seen.  Lugol's iodine solution was then placed on the cervix and any non-staining areas were identified (see cervix diagram).  Cervical biopsies were performed at 7/10/1 o'clock,.  An ECC was collected using a NeuroDermian device and the cytology was  collected with a Cytobrush.   Silver nitrate was " applied to the biopsy sites as needed for hemostasis.  The patient tolerated the procedure OK (pre-procedure anxiety had abated after pre-procedure extended talk with the patient).  EBL was minimal and there were no complications.    ----------------------------------------------------------------------------    IMPRESSION/PLAN:  Problem List Items Addressed This Visit       ASCUS with positive high risk HPV cervical    Relevant Orders    POCT pregnancy, urine manually resulted     Other Visit Diagnoses         HPV vaccine counseling    -  Primary           Abnormal pap smear:  Counseling done on abnormal paps and COLPO.  Natural course of HPV and high rates of regression of LGSIL reviewed with patient.  Need for treatment if HGSIL discussed.  COLPO done today, patient tolerated well.  NOT good candidate for office LEEP due to significant anxiety prior to COLPO procedure today..  Risk of HGSIL/need for treatment per ASCCP shad calculator=4.4%.  Smoking:  no.  Gardasil: patient believes that she was vaccinated when she was younger--she will check with her parents or PEDS, if not previously vaccinated is highly recommended (patient would be agreeable), discussed with the patient that there is never an indication for revaccination and there is no HPV vaccine booster.    Gardasil vaccination:  Gardasil vaccine counseling done and vaccination highly recommended.   Patient has not previously been vaccinated.  Will check insurance benefits and contact patient to schedule a nurse appt for vaccination if elects to proceed.  Use and side effects reviewed.  Indications for prevention of genital warts, cervical dysplasia, and cervical cancer reviewed.  Prevalence of HPV in the [place] reviewed.        F/U in 2-3 weeks for virtual visit for COLPO results.      Cole Jones DO       [1]   Social History  Tobacco Use    Smoking status: Never    Smokeless tobacco: Never   Vaping Use    Vaping status: Every Day   Substance Use Topics     Alcohol use: Never    Drug use: Yes     Types: Marijuana

## 2025-07-15 LAB
LAB AP ASR DISCLAIMER: NORMAL
LABORATORY COMMENT REPORT: NORMAL
PATH REPORT.FINAL DX SPEC: NORMAL
PATH REPORT.GROSS SPEC: NORMAL
PATH REPORT.RELEVANT HX SPEC: NORMAL
PATH REPORT.TOTAL CANCER: NORMAL

## 2025-07-28 ENCOUNTER — APPOINTMENT (OUTPATIENT)
Dept: OBSTETRICS AND GYNECOLOGY | Facility: CLINIC | Age: 30
End: 2025-07-28
Payer: COMMERCIAL

## 2025-07-28 DIAGNOSIS — R87.810 ASCUS WITH POSITIVE HIGH RISK HPV CERVICAL: Primary | ICD-10-CM

## 2025-07-28 DIAGNOSIS — R87.610 ASCUS WITH POSITIVE HIGH RISK HPV CERVICAL: Primary | ICD-10-CM

## 2025-07-28 PROCEDURE — 99213 OFFICE O/P EST LOW 20 MIN: CPT | Performed by: OBSTETRICS & GYNECOLOGY

## 2025-07-28 NOTE — PROGRESS NOTES
GYNECOLOGY VIRTUAL VISIT PROGRESS NOTE          CC:     Chief Complaint   Patient presents with    Results     COLPO results        HPI:  Jeny Graham is scheduled today for virtual visit to discuss COLPO  test results.   Audio and Visual communication real-time were utilized and verbal consent was obtained for the encounter.    Did OK after COLPO, minimal bleeding the day of procedure, mild cramps that day as well.  No new GYN complaints.  Per patient her father reports that she received only 1/2 doses of Gardasil at age 13.      ROS:  GYN - see HPI      PHYSICAL EXAM:  VS:  n/a (virtual visit)  GEN:  A&O, NAD  PSYCH:  normal affect, non-anxious      PATH RESULTS:  7/7/25 -    A. Endocervical curettage:  -- Scant mucus, inflammatory cells and rare superficial strip of cytologically benign endocervix.     B. Cervix, 7 o'clock, biopsy:  -- Squamocolumnar mucosa with reactive changes.  -- Immunohistochemical stain performed on formalin-fixed, paraffin embedded section demonstrates p16 to be negative.      C. Cervix, 1 o'clock, biopsy:  -- Squamocolumnar mucosa with reactive changes.  -- Immunohistochemical stain performed on formalin-fixed, paraffin embedded section demonstrates p16 to be negative.      D. Cervix, 10 o'clock, biopsy:  -- Squamocolumnar mucosa with reactive changes.  -- Immunohistochemical stain performed on formalin-fixed, paraffin embedded section demonstrates p16 to be negative.     5/13/25 - pap=ASCUS, + other HR HPV, HPV 16/18 negative      IMPRESSION/PLAN:  Problem List Items Addressed This Visit          Ob-Gyn Problems    ASCUS with positive high risk HPV cervical - Primary      Abnormal pap:  Pap=ASCUS, + other HR HPV.  COLPO results=normal (p16 staining negative)--discussed with the patient.  Treatment NOT indicated.  Per [facility]P guidelines recommended surveillance is repeat Pap/HPV testing in 1 year.  Smoking=no.  Gardasil vaccination=incomplete.      Gardasil vaccination:  Gardasil  vaccine counseling done as patient's priro vaccination was incomplete--only had 1 of 2 doses at age 13, completion of series highly recommended.   Will check insurance benefits and contact patient to schedule a nurse appt for vaccination if elects to proceed.  Informed the patient that she would not need to restart the vaccination series that she would just need dose two now and then dose #3 four months later.  Use and side effects reviewed.  Indications for prevention of genital warts, cervical dysplasia, and cervical cancer reviewed.  Prevalence of HPV in the [place] reviewed.        Cole Jones DO

## 2025-08-05 ENCOUNTER — APPOINTMENT (OUTPATIENT)
Dept: OBSTETRICS AND GYNECOLOGY | Facility: CLINIC | Age: 30
End: 2025-08-05
Payer: COMMERCIAL

## 2025-08-05 VITALS
BODY MASS INDEX: 21.6 KG/M2 | HEIGHT: 71 IN | WEIGHT: 154.3 LBS | DIASTOLIC BLOOD PRESSURE: 78 MMHG | SYSTOLIC BLOOD PRESSURE: 128 MMHG

## 2025-08-05 DIAGNOSIS — Z23 NEED FOR HPV VACCINE: ICD-10-CM

## 2025-08-05 LAB — PREGNANCY TEST URINE, POC: NEGATIVE

## 2025-08-05 PROCEDURE — 90471 IMMUNIZATION ADMIN: CPT | Performed by: OBSTETRICS & GYNECOLOGY

## 2025-08-05 PROCEDURE — 90651 9VHPV VACCINE 2/3 DOSE IM: CPT | Performed by: OBSTETRICS & GYNECOLOGY

## 2025-08-05 PROCEDURE — 81025 URINE PREGNANCY TEST: CPT | Performed by: OBSTETRICS & GYNECOLOGY

## 2025-08-05 NOTE — PROGRESS NOTES
Gardasil #2 given today right deltoid  Preg test neg  Gardasil #3 due in 4 months  Used office supply

## 2025-09-19 ENCOUNTER — APPOINTMENT (OUTPATIENT)
Dept: PRIMARY CARE | Facility: CLINIC | Age: 30
End: 2025-09-19
Payer: COMMERCIAL

## 2025-11-18 ENCOUNTER — APPOINTMENT (OUTPATIENT)
Dept: PRIMARY CARE | Facility: CLINIC | Age: 30
End: 2025-11-18
Payer: COMMERCIAL

## 2025-12-02 ENCOUNTER — APPOINTMENT (OUTPATIENT)
Dept: OBSTETRICS AND GYNECOLOGY | Facility: CLINIC | Age: 30
End: 2025-12-02
Payer: COMMERCIAL

## 2026-05-20 ENCOUNTER — APPOINTMENT (OUTPATIENT)
Dept: OBSTETRICS AND GYNECOLOGY | Facility: CLINIC | Age: 31
End: 2026-05-20
Payer: COMMERCIAL